# Patient Record
Sex: MALE | Race: WHITE | Employment: OTHER | URBAN - METROPOLITAN AREA
[De-identification: names, ages, dates, MRNs, and addresses within clinical notes are randomized per-mention and may not be internally consistent; named-entity substitution may affect disease eponyms.]

---

## 2018-03-27 ENCOUNTER — TELEPHONE (OUTPATIENT)
Dept: SURGERY | Age: 58
End: 2018-03-27

## 2020-08-05 ENCOUNTER — APPOINTMENT (OUTPATIENT)
Dept: CT IMAGING | Age: 60
End: 2020-08-05
Payer: MEDICARE

## 2020-08-05 ENCOUNTER — APPOINTMENT (OUTPATIENT)
Dept: NUCLEAR MEDICINE | Age: 60
End: 2020-08-05
Attending: FAMILY MEDICINE
Payer: MEDICARE

## 2020-08-05 ENCOUNTER — APPOINTMENT (OUTPATIENT)
Dept: NON INVASIVE DIAGNOSTICS | Age: 60
End: 2020-08-05
Attending: FAMILY MEDICINE
Payer: MEDICARE

## 2020-08-05 ENCOUNTER — HOSPITAL ENCOUNTER (EMERGENCY)
Age: 60
Discharge: ANOTHER HOSPITAL | End: 2020-08-05
Attending: EMERGENCY MEDICINE
Payer: MEDICARE

## 2020-08-05 ENCOUNTER — HOSPITAL ENCOUNTER (OUTPATIENT)
Age: 60
Setting detail: OBSERVATION
Discharge: HOME | End: 2020-08-05
Attending: FAMILY MEDICINE | Admitting: FAMILY MEDICINE
Payer: MEDICARE

## 2020-08-05 VITALS
DIASTOLIC BLOOD PRESSURE: 76 MMHG | RESPIRATION RATE: 16 BRPM | SYSTOLIC BLOOD PRESSURE: 115 MMHG | BODY MASS INDEX: 22.46 KG/M2 | HEART RATE: 64 BPM | WEIGHT: 175 LBS | TEMPERATURE: 97.8 F | OXYGEN SATURATION: 97 % | HEIGHT: 74 IN

## 2020-08-05 VITALS
TEMPERATURE: 97.9 F | BODY MASS INDEX: 24.79 KG/M2 | HEART RATE: 60 BPM | SYSTOLIC BLOOD PRESSURE: 135 MMHG | HEIGHT: 72 IN | OXYGEN SATURATION: 97 % | RESPIRATION RATE: 18 BRPM | DIASTOLIC BLOOD PRESSURE: 88 MMHG | WEIGHT: 183 LBS

## 2020-08-05 PROBLEM — R07.9 CHEST PAIN: Status: ACTIVE | Noted: 2020-08-05

## 2020-08-05 PROBLEM — R10.13 EPIGASTRIC PAIN: Status: ACTIVE | Noted: 2020-08-05

## 2020-08-05 PROBLEM — R07.9 CHEST PAIN: Status: RESOLVED | Noted: 2020-08-05 | Resolved: 2020-08-05

## 2020-08-05 PROBLEM — R79.89 ELEVATED SERUM CREATININE: Status: ACTIVE | Noted: 2020-08-05

## 2020-08-05 LAB
ALBUMIN SERPL-MCNC: 4.5 G/DL (ref 3.5–5.2)
ALP BLD-CCNC: 67 U/L (ref 40–129)
ALT SERPL-CCNC: 9 U/L (ref 0–40)
ANION GAP SERPL CALCULATED.3IONS-SCNC: 14 MMOL/L (ref 7–16)
APTT: 30.3 SEC (ref 24.5–35.1)
AST SERPL-CCNC: 17 U/L (ref 0–39)
BILIRUB SERPL-MCNC: 0.7 MG/DL (ref 0–1.2)
BUN BLDV-MCNC: 18 MG/DL (ref 8–23)
CALCIUM SERPL-MCNC: 9.6 MG/DL (ref 8.6–10.2)
CHLORIDE BLD-SCNC: 101 MMOL/L (ref 98–107)
CHOLESTEROL, TOTAL: 128 MG/DL (ref 0–199)
CO2: 23 MMOL/L (ref 22–29)
CREAT SERPL-MCNC: 1.4 MG/DL (ref 0.7–1.2)
EKG ATRIAL RATE: 58 BPM
EKG P AXIS: 42 DEGREES
EKG P-R INTERVAL: 140 MS
EKG Q-T INTERVAL: 456 MS
EKG QRS DURATION: 102 MS
EKG QTC CALCULATION (BAZETT): 447 MS
EKG R AXIS: 17 DEGREES
EKG T AXIS: 52 DEGREES
EKG VENTRICULAR RATE: 58 BPM
GFR AFRICAN AMERICAN: >60
GFR NON-AFRICAN AMERICAN: 51 ML/MIN/1.73
GLUCOSE BLD-MCNC: 149 MG/DL (ref 74–99)
HCT VFR BLD CALC: 45.5 % (ref 37–54)
HDLC SERPL-MCNC: 48 MG/DL
HEMOGLOBIN: 16.3 G/DL (ref 12.5–16.5)
INR BLD: 1.1
LDL CHOLESTEROL CALCULATED: 64 MG/DL (ref 0–99)
LIPASE: 44 U/L (ref 13–60)
LV EF: 59 %
LVEF MODALITY: NORMAL
MCH RBC QN AUTO: 31.8 PG (ref 26–35)
MCHC RBC AUTO-ENTMCNC: 35.8 % (ref 32–34.5)
MCV RBC AUTO: 88.9 FL (ref 80–99.9)
PDW BLD-RTO: 12.2 FL (ref 11.5–15)
PLATELET # BLD: 237 E9/L (ref 130–450)
PMV BLD AUTO: 12 FL (ref 7–12)
POTASSIUM SERPL-SCNC: 3.5 MMOL/L (ref 3.5–5)
PRO-BNP: 90 PG/ML (ref 0–125)
PROTHROMBIN TIME: 12.4 SEC (ref 9.3–12.4)
RBC # BLD: 5.12 E12/L (ref 3.8–5.8)
SODIUM BLD-SCNC: 138 MMOL/L (ref 132–146)
TOTAL PROTEIN: 7.5 G/DL (ref 6.4–8.3)
TRIGL SERPL-MCNC: 80 MG/DL (ref 0–149)
TROPONIN: <0.01 NG/ML (ref 0–0.03)
VLDLC SERPL CALC-MCNC: 16 MG/DL
WBC # BLD: 14 E9/L (ref 4.5–11.5)

## 2020-08-05 PROCEDURE — 85730 THROMBOPLASTIN TIME PARTIAL: CPT

## 2020-08-05 PROCEDURE — 71275 CT ANGIOGRAPHY CHEST: CPT

## 2020-08-05 PROCEDURE — 78452 HT MUSCLE IMAGE SPECT MULT: CPT

## 2020-08-05 PROCEDURE — 85610 PROTHROMBIN TIME: CPT

## 2020-08-05 PROCEDURE — 85027 COMPLETE CBC AUTOMATED: CPT

## 2020-08-05 PROCEDURE — 84484 ASSAY OF TROPONIN QUANT: CPT

## 2020-08-05 PROCEDURE — G0378 HOSPITAL OBSERVATION PER HR: HCPCS

## 2020-08-05 PROCEDURE — 2580000003 HC RX 258: Performed by: EMERGENCY MEDICINE

## 2020-08-05 PROCEDURE — 2500000003 HC RX 250 WO HCPCS: Performed by: EMERGENCY MEDICINE

## 2020-08-05 PROCEDURE — 6360000002 HC RX W HCPCS: Performed by: FAMILY MEDICINE

## 2020-08-05 PROCEDURE — 3430000000 HC RX DIAGNOSTIC RADIOPHARMACEUTICAL: Performed by: RADIOLOGY

## 2020-08-05 PROCEDURE — A9500 TC99M SESTAMIBI: HCPCS | Performed by: RADIOLOGY

## 2020-08-05 PROCEDURE — G0379 DIRECT REFER HOSPITAL OBSERV: HCPCS

## 2020-08-05 PROCEDURE — 80061 LIPID PANEL: CPT

## 2020-08-05 PROCEDURE — 93017 CV STRESS TEST TRACING ONLY: CPT

## 2020-08-05 PROCEDURE — 96372 THER/PROPH/DIAG INJ SC/IM: CPT

## 2020-08-05 PROCEDURE — 36415 COLL VENOUS BLD VENIPUNCTURE: CPT

## 2020-08-05 PROCEDURE — 99285 EMERGENCY DEPT VISIT HI MDM: CPT

## 2020-08-05 PROCEDURE — 6370000000 HC RX 637 (ALT 250 FOR IP): Performed by: EMERGENCY MEDICINE

## 2020-08-05 PROCEDURE — 6360000002 HC RX W HCPCS: Performed by: EMERGENCY MEDICINE

## 2020-08-05 PROCEDURE — 93018 CV STRESS TEST I&R ONLY: CPT | Performed by: INTERNAL MEDICINE

## 2020-08-05 PROCEDURE — 93016 CV STRESS TEST SUPVJ ONLY: CPT | Performed by: INTERNAL MEDICINE

## 2020-08-05 PROCEDURE — 6360000004 HC RX CONTRAST MEDICATION: Performed by: RADIOLOGY

## 2020-08-05 PROCEDURE — 99219 PR INITIAL OBSERVATION CARE/DAY 50 MINUTES: CPT | Performed by: FAMILY MEDICINE

## 2020-08-05 PROCEDURE — 80053 COMPREHEN METABOLIC PANEL: CPT

## 2020-08-05 PROCEDURE — 6370000000 HC RX 637 (ALT 250 FOR IP): Performed by: FAMILY MEDICINE

## 2020-08-05 PROCEDURE — 83880 ASSAY OF NATRIURETIC PEPTIDE: CPT

## 2020-08-05 PROCEDURE — 83690 ASSAY OF LIPASE: CPT

## 2020-08-05 PROCEDURE — 96374 THER/PROPH/DIAG INJ IV PUSH: CPT

## 2020-08-05 PROCEDURE — 99284 EMERGENCY DEPT VISIT MOD MDM: CPT

## 2020-08-05 PROCEDURE — 2580000003 HC RX 258: Performed by: RADIOLOGY

## 2020-08-05 PROCEDURE — 2580000003 HC RX 258: Performed by: FAMILY MEDICINE

## 2020-08-05 PROCEDURE — 96375 TX/PRO/DX INJ NEW DRUG ADDON: CPT

## 2020-08-05 PROCEDURE — 93005 ELECTROCARDIOGRAM TRACING: CPT | Performed by: EMERGENCY MEDICINE

## 2020-08-05 RX ORDER — SODIUM CHLORIDE 0.9 % (FLUSH) 0.9 %
10 SYRINGE (ML) INJECTION ONCE
Status: COMPLETED | OUTPATIENT
Start: 2020-08-05 | End: 2020-08-05

## 2020-08-05 RX ORDER — SODIUM CHLORIDE 9 MG/ML
INJECTION, SOLUTION INTRAVENOUS CONTINUOUS
Status: DISCONTINUED | OUTPATIENT
Start: 2020-08-05 | End: 2020-08-05 | Stop reason: HOSPADM

## 2020-08-05 RX ORDER — ASPIRIN 81 MG/1
81 TABLET, CHEWABLE ORAL DAILY
Qty: 30 TABLET | Refills: 3 | Status: SHIPPED | OUTPATIENT
Start: 2020-08-06 | End: 2021-09-07

## 2020-08-05 RX ORDER — ACETAMINOPHEN 650 MG/1
650 SUPPOSITORY RECTAL EVERY 6 HOURS PRN
Status: DISCONTINUED | OUTPATIENT
Start: 2020-08-05 | End: 2020-08-05 | Stop reason: HOSPADM

## 2020-08-05 RX ORDER — FENTANYL CITRATE 50 UG/ML
50 INJECTION, SOLUTION INTRAMUSCULAR; INTRAVENOUS ONCE
Status: COMPLETED | OUTPATIENT
Start: 2020-08-05 | End: 2020-08-05

## 2020-08-05 RX ORDER — NITROGLYCERIN 0.4 MG/1
0.4 TABLET SUBLINGUAL EVERY 5 MIN PRN
Status: DISCONTINUED | OUTPATIENT
Start: 2020-08-05 | End: 2020-08-05 | Stop reason: HOSPADM

## 2020-08-05 RX ORDER — 0.9 % SODIUM CHLORIDE 0.9 %
1000 INTRAVENOUS SOLUTION INTRAVENOUS ONCE
Status: COMPLETED | OUTPATIENT
Start: 2020-08-05 | End: 2020-08-05

## 2020-08-05 RX ORDER — ATORVASTATIN CALCIUM 40 MG/1
40 TABLET, FILM COATED ORAL NIGHTLY
Status: DISCONTINUED | OUTPATIENT
Start: 2020-08-05 | End: 2020-08-05 | Stop reason: HOSPADM

## 2020-08-05 RX ORDER — SODIUM CHLORIDE 0.9 % (FLUSH) 0.9 %
10 SYRINGE (ML) INJECTION PRN
Status: DISCONTINUED | OUTPATIENT
Start: 2020-08-05 | End: 2020-08-05 | Stop reason: HOSPADM

## 2020-08-05 RX ORDER — ONDANSETRON 2 MG/ML
4 INJECTION INTRAMUSCULAR; INTRAVENOUS EVERY 6 HOURS PRN
Status: DISCONTINUED | OUTPATIENT
Start: 2020-08-05 | End: 2020-08-05 | Stop reason: HOSPADM

## 2020-08-05 RX ORDER — ASPIRIN 81 MG/1
324 TABLET, CHEWABLE ORAL ONCE
Status: COMPLETED | OUTPATIENT
Start: 2020-08-05 | End: 2020-08-05

## 2020-08-05 RX ORDER — ONDANSETRON 2 MG/ML
4 INJECTION INTRAMUSCULAR; INTRAVENOUS ONCE
Status: COMPLETED | OUTPATIENT
Start: 2020-08-05 | End: 2020-08-05

## 2020-08-05 RX ORDER — SODIUM CHLORIDE 0.9 % (FLUSH) 0.9 %
10 SYRINGE (ML) INJECTION EVERY 12 HOURS SCHEDULED
Status: DISCONTINUED | OUTPATIENT
Start: 2020-08-05 | End: 2020-08-05 | Stop reason: HOSPADM

## 2020-08-05 RX ORDER — KETOROLAC TROMETHAMINE 30 MG/ML
15 INJECTION, SOLUTION INTRAMUSCULAR; INTRAVENOUS ONCE
Status: COMPLETED | OUTPATIENT
Start: 2020-08-05 | End: 2020-08-05

## 2020-08-05 RX ORDER — ATORVASTATIN CALCIUM 40 MG/1
40 TABLET, FILM COATED ORAL NIGHTLY
Qty: 30 TABLET | Refills: 3 | Status: SHIPPED | OUTPATIENT
Start: 2020-08-05 | End: 2020-08-07

## 2020-08-05 RX ORDER — POLYETHYLENE GLYCOL 3350 17 G/17G
17 POWDER, FOR SOLUTION ORAL DAILY PRN
Status: DISCONTINUED | OUTPATIENT
Start: 2020-08-05 | End: 2020-08-05 | Stop reason: HOSPADM

## 2020-08-05 RX ORDER — ASPIRIN 81 MG/1
81 TABLET, CHEWABLE ORAL DAILY
Status: DISCONTINUED | OUTPATIENT
Start: 2020-08-06 | End: 2020-08-05 | Stop reason: HOSPADM

## 2020-08-05 RX ORDER — PANTOPRAZOLE SODIUM 40 MG/1
40 TABLET, DELAYED RELEASE ORAL
Status: DISCONTINUED | OUTPATIENT
Start: 2020-08-05 | End: 2020-08-05 | Stop reason: HOSPADM

## 2020-08-05 RX ORDER — PROMETHAZINE HYDROCHLORIDE 25 MG/1
12.5 TABLET ORAL EVERY 6 HOURS PRN
Status: DISCONTINUED | OUTPATIENT
Start: 2020-08-05 | End: 2020-08-05 | Stop reason: HOSPADM

## 2020-08-05 RX ORDER — ACETAMINOPHEN 325 MG/1
650 TABLET ORAL EVERY 6 HOURS PRN
Status: DISCONTINUED | OUTPATIENT
Start: 2020-08-05 | End: 2020-08-05 | Stop reason: HOSPADM

## 2020-08-05 RX ADMIN — SODIUM CHLORIDE 1000 ML: 9 INJECTION, SOLUTION INTRAVENOUS at 01:31

## 2020-08-05 RX ADMIN — ONDANSETRON 4 MG: 2 INJECTION INTRAMUSCULAR; INTRAVENOUS at 00:42

## 2020-08-05 RX ADMIN — SODIUM CHLORIDE, PRESERVATIVE FREE 10 ML: 5 INJECTION INTRAVENOUS at 08:17

## 2020-08-05 RX ADMIN — Medication 10 ML: at 01:44

## 2020-08-05 RX ADMIN — LIDOCAINE HYDROCHLORIDE: 20 SOLUTION ORAL; TOPICAL at 01:29

## 2020-08-05 RX ADMIN — IOPAMIDOL 90 ML: 755 INJECTION, SOLUTION INTRAVENOUS at 01:44

## 2020-08-05 RX ADMIN — Medication 10 MILLICURIE: at 11:43

## 2020-08-05 RX ADMIN — ENOXAPARIN SODIUM 40 MG: 40 INJECTION SUBCUTANEOUS at 08:17

## 2020-08-05 RX ADMIN — NITROGLYCERIN 0.4 MG: 0.4 TABLET, ORALLY DISINTEGRATING SUBLINGUAL at 00:48

## 2020-08-05 RX ADMIN — SODIUM CHLORIDE: 9 INJECTION, SOLUTION INTRAVENOUS at 08:17

## 2020-08-05 RX ADMIN — NITROGLYCERIN 0.4 MG: 0.4 TABLET, ORALLY DISINTEGRATING SUBLINGUAL at 00:43

## 2020-08-05 RX ADMIN — ASPIRIN 324 MG: 81 TABLET, CHEWABLE ORAL at 00:40

## 2020-08-05 RX ADMIN — KETOROLAC TROMETHAMINE 15 MG: 30 INJECTION, SOLUTION INTRAMUSCULAR at 01:56

## 2020-08-05 RX ADMIN — FAMOTIDINE 20 MG: 10 INJECTION INTRAVENOUS at 00:43

## 2020-08-05 RX ADMIN — Medication 30 MILLICURIE: at 11:43

## 2020-08-05 RX ADMIN — PANTOPRAZOLE SODIUM 40 MG: 40 TABLET, DELAYED RELEASE ORAL at 08:17

## 2020-08-05 RX ADMIN — FENTANYL CITRATE 50 MCG: 50 INJECTION INTRAMUSCULAR; INTRAVENOUS at 00:54

## 2020-08-05 ASSESSMENT — PAIN DESCRIPTION - ORIENTATION: ORIENTATION: MID

## 2020-08-05 ASSESSMENT — ENCOUNTER SYMPTOMS
TROUBLE SWALLOWING: 0
VOMITING: 0
NAUSEA: 1
COUGH: 0
DIARRHEA: 0
SHORTNESS OF BREATH: 0
CONSTIPATION: 0
ABDOMINAL PAIN: 1
BACK PAIN: 1
PHOTOPHOBIA: 0

## 2020-08-05 ASSESSMENT — PAIN SCALES - GENERAL
PAINLEVEL_OUTOF10: 10
PAINLEVEL_OUTOF10: 10
PAINLEVEL_OUTOF10: 0
PAINLEVEL_OUTOF10: 10

## 2020-08-05 ASSESSMENT — PAIN DESCRIPTION - ONSET
ONSET: ON-GOING
ONSET: ON-GOING

## 2020-08-05 ASSESSMENT — PAIN DESCRIPTION - PROGRESSION
CLINICAL_PROGRESSION: NOT CHANGED
CLINICAL_PROGRESSION: GRADUALLY IMPROVING

## 2020-08-05 ASSESSMENT — PAIN - FUNCTIONAL ASSESSMENT
PAIN_FUNCTIONAL_ASSESSMENT: PREVENTS OR INTERFERES SOME ACTIVE ACTIVITIES AND ADLS
PAIN_FUNCTIONAL_ASSESSMENT: PREVENTS OR INTERFERES SOME ACTIVE ACTIVITIES AND ADLS

## 2020-08-05 ASSESSMENT — PAIN DESCRIPTION - DESCRIPTORS
DESCRIPTORS: DISCOMFORT
DESCRIPTORS: CONSTANT;HEAVINESS

## 2020-08-05 ASSESSMENT — PAIN DESCRIPTION - FREQUENCY: FREQUENCY: CONTINUOUS

## 2020-08-05 ASSESSMENT — PAIN DESCRIPTION - LOCATION
LOCATION: CHEST
LOCATION: CHEST

## 2020-08-05 ASSESSMENT — PAIN DESCRIPTION - PAIN TYPE
TYPE: ACUTE PAIN
TYPE: ACUTE PAIN

## 2020-08-05 NOTE — PROGRESS NOTES
Patient seen and examined at bedside. Currently having no chest pain. Resting comfortably. Denies palpitations, abdominal pain, N/V, and shortness of breath. Physical examination showed heart RRR; lungs CTAB and without wheezing, rhonci, or rales; abdomen soft with epigastric/xyphoid tenderness to palpation; no LE edema. No changes to original plan. Await troponin #2 and #3. Await stress test results.      Electronically signed by Ryanne Ahn DO on 8/5/2020 at 7:39 AM

## 2020-08-05 NOTE — H&P
[place] [id number]  Resident History and Physical      CHIEF COMPLAINT: Chest pain and Epigastric pain     History of Present Illness:   Ryanne Ponce  is a 63 y.o. male patient of Beth Guidry MD  with a pertinent PMHx of tobacco abuse who presented to the [facility] per direct admit with chief complaint of chest and epigastric pain for the past couple hours. Patient reports that he was at home when he started to experience chest/epigastric pain a few hours prior to arrival in the ED. The pain was more severe than with previous episodes and prompted his decision to seek care. It was associated with mild diaphoresis and nausea. He is unable to describe the quality of the pain. It is located below his sternum and in epigastric region with some pain in his upper arms at times. The pain usually lasts several hours. He has noticed some association with certain foods and avoids \"red\" foods as he can. He also has had increased pain after playing with his 4 grandchildren for a weekend in the past. He has not tried alleviating his pain with OTC medications. He has tried changing his sleep position and standing under a warm shower which has helped in the past. OTC tums and heart burn pills have helped as well. He has had similar episodes occurring intermittently over the past several months. Cardiac history  . [facility] reports smoking a 1/2 pack a day for the past 30 years. There is no stress test on file. In the [facility] ED, pt was initially given mitroglycerin,  mg, ondansetron 4 mg IV, Famotidine 20 mg IV, fentanyl 50 mcg IV. He was later given a GI cocktail and ketorolac 15 mg. Pt reports that his pain did not improve at first but did somewhat later on. His EKG was unchanged from previous. CTA chest did not reveal evidence of PE. Heart score was determined to be 4 points. Labs were remarkable for an elevated creatinine 1.4, troponin negative, WBC 14.  The pt's BP was initially elevated but has since normalized. Pt was directly admitted from the ED due to acute chest pain. ROS:   Review of Systems   Constitutional: Positive for diaphoresis. Negative for chills and fever. HENT: Negative for congestion and trouble swallowing. Eyes: Negative for photophobia and visual disturbance. Respiratory: Negative for cough and shortness of breath. Cardiovascular: Positive for chest pain. Negative for leg swelling. Gastrointestinal: Positive for abdominal pain and nausea. Negative for constipation, diarrhea and vomiting. Genitourinary: Negative for dysuria and hematuria. Musculoskeletal: Positive for back pain. Skin: Negative for rash and wound. Neurological: Negative for light-headedness and headaches. Psychiatric/Behavioral: Negative for confusion. The patient is not nervous/anxious.           Past Medical History:   Diagnosis Date    Acute prostatitis 4/3/2015    4/2015    Chronic back pain     GERD (gastroesophageal reflux disease) 10/10/2014    Herniated nucleus pulposus, L2-3 left     Herniated nucleus pulposus, L4-5     Inguinal hernia 8-14-15    Right, for repair/revision    Left shoulder pain 10/10/2014    Since 9/2014    Right inguinal hernia 10/10/2014    Sprain and strain of lumbosacral joint/ligament          Past Surgical History:   Procedure Laterality Date    BACK SURGERY  7/28/03    LLAM L3-4 left by Dr. Chaz Lew COLONOSCOPY  2014     normal    FOOT SURGERY Left 1980's    to remove pinched nerve    HERNIA REPAIR Right 11/18/2014    inguinal- laproscopic    INGUINAL HERNIA REPAIR  8/`14/15    right     MOUTH SURGERY      multiple surgeries    OTHER SURGICAL HISTORY Left 1990's    thumb to repair traumatic injury    OTHER SURGICAL HISTORY Right 11/185/2014    laparoscopic right inguinal hernia repair    TONSILLECTOMY      UPPER GASTROINTESTINAL ENDOSCOPY  2014    normal       Medications Prior to Admission:    Prior to Admission medications    Medication Sig Start Date End Date Taking? Authorizing Provider   omeprazole (PRILOSEC) 20 MG capsule TAKE 1 CAPSULE BY MOUTH ONCE DAILY 9/3/15  Yes Barron Pina MD        Allergies:   Patient has no known allergies. Social History:    reports that he has been smoking. He has a 15.00 pack-year smoking history. He has never used smokeless tobacco. He reports that he does not drink alcohol or use drugs. Family History:   family history includes Blindness in his son; Cancer in his brother and father; High Blood Pressure in his mother; Other (age of onset: 10) in his son. PHYSICAL EXAM:    Vitals:  /78   Pulse 68   Temp 97.7 °F (36.5 °C) (Oral)   Resp 16   Ht 6' (1.829 m)   Wt 183 lb (83 kg)   SpO2 98%   BMI 24.82 kg/m²     Physical Exam  Nursing note reviewed. Constitutional:       General: He is not in acute distress. Appearance: He is normal weight. He is not ill-appearing, toxic-appearing or diaphoretic. HENT:      Head: Normocephalic and atraumatic. Mouth/Throat:      Mouth: Mucous membranes are moist.      Pharynx: Oropharynx is clear. No oropharyngeal exudate or posterior oropharyngeal erythema. Eyes:      Conjunctiva/sclera: Conjunctivae normal.      Pupils: Pupils are equal, round, and reactive to light. Cardiovascular:      Rate and Rhythm: Normal rate and regular rhythm. Heart sounds: Normal heart sounds. No murmur. No gallop. Pulmonary:      Effort: Pulmonary effort is normal.      Breath sounds: Normal breath sounds. No wheezing, rhonchi or rales. Abdominal:      General: Bowel sounds are normal. There is no distension. Palpations: Abdomen is soft. Tenderness: There is abdominal tenderness in the epigastric area. There is no guarding. Comments: Pt very tender on palpation of epigastric region   Musculoskeletal:      Right lower leg: No edema. Left lower leg: No edema.       Comments: No reproducible chest wall tenderness Skin:     General: Skin is warm. Findings: No rash. Neurological:      General: No focal deficit present. Mental Status: He is alert and oriented to person, place, and time.    Psychiatric:         Mood and Affect: Mood normal.         Behavior: Behavior normal.         LABS:  Recent Results (from the past 24 hour(s))   CBC    Collection Time: 08/05/20  1:03 AM   Result Value Ref Range    WBC 14.0 (H) 4.5 - 11.5 E9/L    RBC 5.12 3.80 - 5.80 E12/L    Hemoglobin 16.3 12.5 - 16.5 g/dL    Hematocrit 45.5 37.0 - 54.0 %    MCV 88.9 80.0 - 99.9 fL    MCH 31.8 26.0 - 35.0 pg    MCHC 35.8 (H) 32.0 - 34.5 %    RDW 12.2 11.5 - 15.0 fL    Platelets 863 625 - 714 E9/L    MPV 12.0 7.0 - 12.0 fL   Protime-INR    Collection Time: 08/05/20  1:03 AM   Result Value Ref Range    Protime 12.4 9.3 - 12.4 sec    INR 1.1    APTT    Collection Time: 08/05/20  1:03 AM   Result Value Ref Range    aPTT 30.3 24.5 - 35.1 sec   Troponin    Collection Time: 08/05/20  1:03 AM   Result Value Ref Range    Troponin <0.01 0.00 - 0.03 ng/mL   Brain Natriuretic Peptide    Collection Time: 08/05/20  1:03 AM   Result Value Ref Range    Pro-BNP 90 0 - 125 pg/mL   Comprehensive metabolic panel    Collection Time: 08/05/20  1:03 AM   Result Value Ref Range    Sodium 138 132 - 146 mmol/L    Potassium 3.5 3.5 - 5.0 mmol/L    Chloride 101 98 - 107 mmol/L    CO2 23 22 - 29 mmol/L    Anion Gap 14 7 - 16 mmol/L    Glucose 149 (H) 74 - 99 mg/dL    BUN 18 8 - 23 mg/dL    CREATININE 1.4 (H) 0.7 - 1.2 mg/dL    GFR Non-African American 51 >=60 mL/min/1.73    GFR African American >60     Calcium 9.6 8.6 - 10.2 mg/dL    Total Protein 7.5 6.4 - 8.3 g/dL    Alb 4.5 3.5 - 5.2 g/dL    Total Bilirubin 0.7 0.0 - 1.2 mg/dL    Alkaline Phosphatase 67 40 - 129 U/L    ALT 9 0 - 40 U/L    AST 17 0 - 39 U/L   Lipase    Collection Time: 08/05/20  1:03 AM   Result Value Ref Range    Lipase 44 13 - 60 U/L       No orders to display       ASSESSMENT/PLAN:      Active Hospital Problems    Diagnosis Date Noted    Chest pain [R07.9] 08/05/2020    Elevated serum creatinine [R79.89] 08/05/2020    Epigastric pain [R10.13] 08/05/2020     Chest pain  Risk factors: tobacco use  Cardiac pain less likely than GI etiology. Pain did not improve significantly with nitro. No stress test on file. - Exercise stress test with nuclear imagining  - start atorvastatin 40 mg and aspirin 81 mg daily - evaluate need for continuation after further work up  - lipid panel, hemoglobin A1c for risk stratification  - Nitro PRN  - telemetry   - repeat EKG tomorrow  - cycle troponin     Epigastric pain  Likely main contributor to pain. Tenderness to epigastric region. Risk factors for gastric tumor include his age and smoking. Likely component of gastritis due to frequent episodes of epigastric pain, unhealthy diet and location of pain. - start daily pantoprazole 40 mg daily   - would recommend outpatient EGD  - POCT hemoccult     Elevated creatinine  Historically normal creatinine. No recent value to determine if acute or chronic. Poor outpatient follow up with PCP. Did receive contrast for CTA which will likely worsen kidney function. - MIVF NS @ 100 ml/hr  - urine electrolytes, urine creatinine to calculate FeNa   - urinalysis   - daily CMP    Mildly elevated white blood cell count  No indication of infection. Monitor with daily CBC    DVT ppx: enoxaparin 40 mg   GI ppx: pantoprazole 40 mg daily   Code Status: full code     Case discussed with attending on call Dr. Joey Cabot.     Pascale Al MD  Family Medicine Resident Physician   8/5/2020

## 2020-08-05 NOTE — ED PROVIDER NOTES
HPI:  8/5/20,   Time: 12:23 AM EDT      Dasha Boateng is a 63 y.o. male presenting to the ED for midsternal chest pain, beginning 2 hours ago. The complaint has been persistent, moderate in severity, and worsened by nothing. Patient denies any shortness of breath but did have some mild diaphoresis associated. He did have nausea upon arriving here with no vomiting. No radiation of chest discomfort to the neck/jaw nor to the left upper extremity. No sharp stabbing character pain and no radiation of pain to the back. Review of Systems:   Pertinent positives and negatives are stated within HPI, all other systems reviewed and are negative.      --------------------------------------------- PAST HISTORY ---------------------------------------------  Past Medical History:  has a past medical history of Acute prostatitis, Chronic back pain, GERD (gastroesophageal reflux disease), Herniated nucleus pulposus, L2-3 left, Herniated nucleus pulposus, L4-5, Inguinal hernia, Left shoulder pain, Right inguinal hernia, and Sprain and strain of lumbosacral joint/ligament. Past Surgical History:  has a past surgical history that includes back surgery (7/28/03); Foot surgery (Left, 1980's); Mouth surgery; Tonsillectomy; Upper gastrointestinal endoscopy (2014); other surgical history (Left, 1990's); other surgical history (Right, 11/185/2014); hernia repair (Right, 11/18/2014); Colonoscopy (2014 ); and Inguinal hernia repair (8/`14/15). Social History:  reports that he has been smoking. He has a 15.00 pack-year smoking history. He has never used smokeless tobacco. He reports that he does not drink alcohol or use drugs. Family History: family history includes Blindness in his son; Cancer in his brother and father; High Blood Pressure in his mother; Other (age of onset: 10) in his son. The patients home medications have been reviewed.     Allergies: Patient has no known allergies. ---------------------------------------------------PHYSICAL EXAM--------------------------------------    Constitutional/General: Alert and oriented x3, well appearing, non toxic in moderate distress. Head: Normocephalic and atraumatic  Eyes: PERRL, EOMI, conjunctive normal, sclera non icteric  Mouth: Oropharynx clear, handling secretions, no trismus, no asymmetry of the posterior oropharynx or uvular edema  Neck: Supple, full ROM, non tender to palpation in the midline, no stridor, no crepitus, no meningeal signs  Respiratory: Lungs clear to auscultation bilaterally, no wheezes, rales, or rhonchi. Not in respiratory distress  Cardiovascular:  Regular rate. Regular rhythm. No murmurs, gallops, or rubs. 2+ distal pulses  Chest: No chest wall tenderness  GI:  Abdomen Soft, Non tender, Non distended. +BS. No organomegaly, no palpable masses,  No rebound, guarding, or rigidity. Musculoskeletal: Moves all extremities x 4. Warm and well perfused, no clubbing, cyanosis, or edema. Capillary refill <3 seconds  Integument: skin warm and dry. No rashes. Lymphatic: no lymphadenopathy noted  Neurologic: GCS 15, no focal deficits, symmetric strength 5/5 in the upper and lower extremities bilaterally  Psychiatric: Normal Affect    -------------------------------------------------- RESULTS -------------------------------------------------  I have personally reviewed all laboratory and imaging results for this patient. Results are listed below.      LABS:  Results for orders placed or performed during the hospital encounter of 08/05/20   CBC   Result Value Ref Range    WBC 14.0 (H) 4.5 - 11.5 E9/L    RBC 5.12 3.80 - 5.80 E12/L    Hemoglobin 16.3 12.5 - 16.5 g/dL    Hematocrit 45.5 37.0 - 54.0 %    MCV 88.9 80.0 - 99.9 fL    MCH 31.8 26.0 - 35.0 pg    MCHC 35.8 (H) 32.0 - 34.5 %    RDW 12.2 11.5 - 15.0 fL    Platelets 203 545 - 492 E9/L    MPV 12.0 7.0 - 12.0 fL   Protime-INR   Result Value Ref Range    Protime 12.4 9.3 - 12.4 sec    INR 1.1    APTT   Result Value Ref Range    aPTT 30.3 24.5 - 35.1 sec   Troponin   Result Value Ref Range    Troponin <0.01 0.00 - 0.03 ng/mL   Brain Natriuretic Peptide   Result Value Ref Range    Pro-BNP 90 0 - 125 pg/mL   Comprehensive metabolic panel   Result Value Ref Range    Sodium 138 132 - 146 mmol/L    Potassium 3.5 3.5 - 5.0 mmol/L    Chloride 101 98 - 107 mmol/L    CO2 23 22 - 29 mmol/L    Anion Gap 14 7 - 16 mmol/L    Glucose 149 (H) 74 - 99 mg/dL    BUN 18 8 - 23 mg/dL    CREATININE 1.4 (H) 0.7 - 1.2 mg/dL    GFR Non-African American 51 >=60 mL/min/1.73    GFR African American >60     Calcium 9.6 8.6 - 10.2 mg/dL    Total Protein 7.5 6.4 - 8.3 g/dL    Alb 4.5 3.5 - 5.2 g/dL    Total Bilirubin 0.7 0.0 - 1.2 mg/dL    Alkaline Phosphatase 67 40 - 129 U/L    ALT 9 0 - 40 U/L    AST 17 0 - 39 U/L   Lipase   Result Value Ref Range    Lipase 44 13 - 60 U/L       RADIOLOGY:  Interpreted by Radiologist.  CTA PULMONARY W CONTRAST   Final Result      1. NORMAL CT OF THE THORAX WITH CTA OF THE PULMONARY ARTERIES WITH   CONTRAST ENHANCEMENT. 2. NO EVIDENCE OF PULMONARY EMBOLUS. ------------------------- NURSING NOTES AND VITALS REVIEWED ---------------------------    The nursing notes within the ED encounter and vital signs as below have been reviewed by myself. BP (!) 144/92   Pulse 62   Temp 97 °F (36.1 °C)   Ht 6' 2\" (1.88 m)   Wt 175 lb (79.4 kg)   SpO2 99%   BMI 22.47 kg/m² . Oxygen Saturation Interpretation: Normal  The patients available past medical records and past encounters were reviewed.       ------------------------------ ED COURSE/MEDICAL DECISION MAKING----------------------  Medications   nitroGLYCERIN (NITROSTAT) SL tablet 0.4 mg (0.4 mg Sublingual Given 8/5/20 0048)   aspirin chewable tablet 324 mg (324 mg Oral Given 8/5/20 0040)   ondansetron (ZOFRAN) injection 4 mg (4 mg Intravenous Given 8/5/20 0042)   famotidine (PEPCID) injection 20 mg (20 mg Intravenous Given 8/5/20 0043)   fentaNYL (SUBLIMAZE) injection 50 mcg (50 mcg Intravenous Given 8/5/20 0054)   0.9 % sodium chloride bolus (1,000 mLs Intravenous New Bag 8/5/20 0131)   aluminum & magnesium hydroxide-simethicone (MAALOX) 30 mL, lidocaine viscous hcl (XYLOCAINE) 5 mL (GI COCKTAIL) ( Oral Given 8/5/20 0129)   iopamidol (ISOVUE-370) 76 % injection 90 mL (90 mLs Intravenous Given 8/5/20 0144)   sodium chloride flush 0.9 % injection 10 mL (10 mLs Intravenous Given 8/5/20 0144)   ketorolac (TORADOL) injection 15 mg (15 mg Intravenous Given 8/5/20 0156)         ED COURSE:     Medical Decision Making:   Differential Diagnoses:  MI, PE, Chest Wall Strain, Pneumonia, GERD, Pneumothorax, Pericarditis, Aortic Dissection, COVID-19, to name a few. Pt's HEART Score = 4 points, Moderate Risk Score  Pt's Well's Score for PE = 0 points, and his CTA pulmonary study showed no evidence of focal infiltrates nor PE nor any mediastinal abnormalities to suggest aortic dissection    The HEART Risk Score for Acute Coronary Syndrome  Age <46 years, 55-63, 65+  ? 55-63   > 2 Risk Factors for CAD?*, 1-2, 0  2   History: slight, moderate, highly suspicious Moderate   EKG: Normal, nonspecific repolarization changes, ST depression  Nonspecific   Troponin; low, 1-3x normal  Limit, 3x+ Low   Total HEART Score:  4 points     *Risk factors as follows:                  - History/Family history of CAD    - Hypertension      - Hyperlipidemia     - Diabetes mellitus  - Current smoker  - Obesity    Predicts 6-week risk of major adverse cardiac event. Low Score (0-3 points), risk of MACE of 0.9-1.7%. Moderate Score (4-6 points), risk of MACE of 12-16.6%. High Score (7-10 points), risk of MACE of 50-65%. EKG: This EKG is signed and interpreted by the EP.     Time: 0:24  Rate: 58  Rhythm: Sinus Bradycardia  Interpretation: non-specific EKG  Comparison: stable as compared to patient's most recent EKG dated 8/14/20    This patient's ED course included: a personal history and physicial examination, re-evaluation prior to disposition, multiple bedside re-evaluations, IV medications, cardiac monitoring and continuous pulse oximetry  This patient has remained hemodynamically stable during their ED course. Re-Evaluations:        Re-evaluation. Patients symptoms are improving    Re-examination  8/5/20   12:23 AM EDT          Vital Signs:   Vitals:    08/05/20 0024 08/05/20 0030 08/05/20 0100 08/05/20 0130   BP:  (!) 190/110 (!) 140/87 (!) 144/92   Pulse:  66 59 62   Temp:  97 °F (36.1 °C)     SpO2:  99% 96% 99%   Weight: 175 lb (79.4 kg)      Height: 6' 2\" (1.88 m)        Card/Pulm:  Rhythm: normal rate. Heart Sounds: no murmurs, gallops, or rubs. clear to auscultation, no wheezes or rales and unlabored breathing. Capillary Refill: normal.  Radial Pulse:  present 2+. Skin:  Dry. Consultations:  Spoke w/ Dr. Deandra Winston @ 02:00 she agreed with to accept the patient to her service as an admission on behalf of Dr. Leldon Skiff. Pt. Will be admitted as OBS Telemetry ADM @ [facility]    Counseling: The emergency provider has spoken with the patient and discussed todays results, in addition to providing specific details for the plan of care and counseling regarding the diagnosis and prognosis. Questions are answered at this time and they are agreeable with the plan.       --------------------------------- IMPRESSION AND DISPOSITION ---------------------------------    IMPRESSION  1. Acute chest pain    2. Elevated blood pressure reading        DISPOSITION  Disposition: Admit to telemetry  Patient condition is stable    NOTE: This report was transcribed using voice recognition software.  Every effort was made to ensure accuracy; however, inadvertent computerized transcription errors may be present       Vy Ma MD  08/05/20 0692

## 2020-08-05 NOTE — PROCEDURES
Exercise Nuclear Stress Test:    Cardiologist: Dr. Loretta Abdullahi    Date: 8/5/2020    Indications for study: Chest pain    1. No chest pain  2. Exercise time: 8:31, MPHR: 94%  3. No new arrhythmias  4. No EKG changes suggestive of stress induced ischemia  5.  Nuclear images pending    Amber Castro MD  [facility]) Cardiology

## 2020-08-05 NOTE — CARE COORDINATION
Social work / Discharge Planning:      Social work attempted to see patient for initial assessment but he is out of the room for testing. Chart reviewed. Patient has health insurance and his PCP is Dr. Yulia Charles. Social work will follow and assist with any discharge needs identified.   Electronically signed by MERLENE Ortiz on 8/5/2020 at 12:50 PM

## 2020-08-05 NOTE — ED NOTES
Called [facility] and spoke with Karl Mckeon. ETA 30-45 minutes per [facility].  Awaiting arrival.      Ryan Melendez RN  08/05/20 [id number]

## 2020-08-05 NOTE — DISCHARGE SUMMARY
Physician Discharge Summary         Patient ID:  Renata Sumner  [id number]  89 y.o.  1960    Admit date: 8/5/2020    Discharge date and time: 08/05/2020, round 3pm    Admission Diagnoses:   Patient Active Problem List   Diagnosis    Sub. agg. of pre-existing disc herniation L3-4    Sub. agg. of pre-existing disc herniation L4-5    GERD (gastroesophageal reflux disease)    Left shoulder pain    Acute prostatitis    Elevated serum creatinine    Epigastric pain       Discharge Diagnoses:   Elevated creatinine  GERD  Chest pain- resolved     Consults: none    Procedures:  Cardiac Stress Test    Hospital Course: Renata Sumner is a 60y. o m pt of Dr. Belinda Atwood with PMHx of tobacco abuse who presented to ER for complaining of chest and epigastric pain. Pt started to have pain few hours prior to arrival , pain was associated with nausea. Pt mentions he has pain associated with certain foods in the past and OTC tums has helped him. In the ER pt was given Nitro,ASA, ondansetron, famotidine and fentanyl , but epigastric/chest pain had already resolved upon arrival.  Vitals were stable, Troponin negative and EKG without acute changes. During hospitalization pt did not had any chest pain and went for cardiac stress test which was negative for any perfusion defect and was with normal EF. Troponins were negative x3. Pt will be discharged in stable condition. Pt was started on Lipitor 40 mg due to high ASCVD risk 11.1% , and chest pain/epigastric is more likely to be GERD associated. Referral to General Surgery was made for EGD as an outpatient. Pt can continue PPI. Regarding elevated creatinine , pt will have another BMP tomorrow. Cr was likely elevated due to contrast from the CTA and dehydration. Fluids were given, expect improvement. Pending HbA1C.     Discharge Exam:  See progress note from today    Disposition: home    Patient Instructions:   Discharge Medication List as of 8/5/2020  2:41 PM      START taking these

## 2020-08-05 NOTE — ED NOTES
Report called to floor. Spoke with Go Pinto RN at [phone]. Patient departed ER.       Iman Díaz RN  08/05/20 9920

## 2020-08-06 ENCOUNTER — HOSPITAL ENCOUNTER (OUTPATIENT)
Age: 60
Discharge: HOME | End: 2020-08-06
Payer: MEDICARE

## 2020-08-06 LAB
ANION GAP SERPL CALCULATED.3IONS-SCNC: 10 MMOL/L (ref 7–16)
BUN BLDV-MCNC: 13 MG/DL (ref 8–23)
CALCIUM SERPL-MCNC: 9.2 MG/DL (ref 8.6–10.2)
CHLORIDE BLD-SCNC: 104 MMOL/L (ref 98–107)
CO2: 25 MMOL/L (ref 22–29)
CREAT SERPL-MCNC: 1.2 MG/DL (ref 0.7–1.2)
GFR AFRICAN AMERICAN: >60
GFR NON-AFRICAN AMERICAN: >60 ML/MIN/1.73
GLUCOSE BLD-MCNC: 96 MG/DL (ref 74–99)
POTASSIUM SERPL-SCNC: 4.3 MMOL/L (ref 3.5–5)
SODIUM BLD-SCNC: 139 MMOL/L (ref 132–146)

## 2020-08-06 PROCEDURE — 80048 BASIC METABOLIC PNL TOTAL CA: CPT

## 2020-08-06 PROCEDURE — 36415 COLL VENOUS BLD VENIPUNCTURE: CPT

## 2020-08-06 NOTE — PROGRESS NOTES
[facility]  [facility]  Family Medicine Residency Program      Patient:  Viridiana Cha 63 y.o. male     Date of Service: 8/7/20      Chief complaint:   Chief Complaint   Patient presents with   [id number] [employer] f/u   - GERD      History of Present Illness   The patient is a 63 y.o. male  presented to the clinic with complaints as above. GERD - Presented to the ER with chest and epigastric pain. He had it for a few hours with associated nausea. He states that when these severe symptoms come on it takes hours for them to resolve. He has had GERD symptoms for >20 years and it has gotten worse recently. The pain is associated with red meats, tomatoes, \"anything red\", water, and position changes can cause symptoms. He is currently taking famotidine 20mg 3-5 times per day. He had a scope greater than 5 years but does not know the findings. He is scheduled for an evaluation with Dr. Phylicia Alberto next Wednesday (8/12). He denies nausea, vomiting, or cough. Patient was started on Lipitor after his visit to the ER. His ASCVD Risk score was unable to be calculated. We discontinued his atorvastatin at this time. The ASCVD Risk score (Jamee Bassett., et al., 2013) failed to calculate for the following reasons:     The valid total cholesterol range is 130 to 320 mg/dL    Past Medical History:      Diagnosis Date    Acute prostatitis 4/3/2015    4/2015    Chronic back pain     GERD (gastroesophageal reflux disease) 10/10/2014    Herniated nucleus pulposus, L2-3 left     Herniated nucleus pulposus, L4-5     Inguinal hernia 8-14-15    Right, for repair/revision    Left shoulder pain 10/10/2014    Since 9/2014    Right inguinal hernia 10/10/2014    Sprain and strain of lumbosacral joint/ligament        Past Surgical History:        Procedure Laterality Date    BACK SURGERY  7/28/03    LLAM L3-4 left by Dr. Aldair Marcum COLONOSCOPY  2014     normal    FOOT SURGERY abused: Not on file     Physically abused: Not on file     Forced sexual activity: Not on file   Other Topics Concern    Not on file   Social History Narrative    Not on file        Family History:       Problem Relation Age of Onset    Cancer Father         unknown    Cancer Brother         bladder    Blindness Son         Left eye    Other Son 6        Turret's (movement)    High Blood Pressure Mother        Review of Systems:   Review of Systems   Constitutional: Positive for fatigue. Negative for chills and fever. HENT: Negative for rhinorrhea and sore throat. Respiratory: Negative for cough, chest tightness and shortness of breath. Cardiovascular: Negative for chest pain and palpitations. Gastrointestinal: Negative for abdominal pain, constipation, diarrhea and nausea. Genitourinary: Negative for difficulty urinating and dysuria. Physical Exam   Vitals: /84   Pulse 83   Temp 97 °F (36.1 °C) (Temporal)   Resp 16   Ht 6' (1.829 m)   Wt 177 lb (80.3 kg)   SpO2 98%   BMI 24.01 kg/m²   Physical Exam  Constitutional:       General: He is not in acute distress. Appearance: Normal appearance. HENT:      Head: Normocephalic and atraumatic. Eyes:      Extraocular Movements: Extraocular movements intact. Pupils: Pupils are equal, round, and reactive to light. Cardiovascular:      Rate and Rhythm: Normal rate and regular rhythm. Heart sounds: Normal heart sounds. No murmur. Pulmonary:      Effort: Pulmonary effort is normal.      Breath sounds: Normal breath sounds. Abdominal:      General: Bowel sounds are normal.      Palpations: Abdomen is soft. Tenderness: There is abdominal tenderness. Musculoskeletal:         General: No swelling. Neurological:      Mental Status: He is alert. Assessment and Plan     1.  Gastroesophageal reflux disease, esophagitis presence not specified  - Appointment with Dr. Malvin Aldana scheduled for next week  - Can take famotidine PRN with the omeprazole  - omeprazole (PRILOSEC) 40 MG delayed release capsule; Take 1 capsule by mouth every morning (before breakfast)  Dispense: 30 capsule; Refill: 0  - Can take Pepto OTC  - Counseled patient on trigger foods    2. Chest pain due to GERD  - Normal stress test with an EF of 59%  - Lipid panel normal, ASCVD can't be calculated due to low total cholesterol.  - Reviewed labs with patient   - Discontinued atorvastatin for now    Counseled regarding above diagnosis, including possible risks and complications,  especially if left uncontrolled. Counseled regarding the possible side effects, risks, benefits and alternatives to treatment;patient and/or guardian verbalizes understanding, agrees, feels comfortable with and wishes to proceed with above treatment plan. Call or go to ED immediately if symptoms worsen or persist. Advised patient to call with any new medication issues, and, as applicable, read all Rx info from pharmacy to assure aware of all possible risks and side effects of medication before taking. Patient and/or guardian given opportunity to ask questions/raise concerns. The patient verbalized comfort and understanding of instructions. I encourage further reading and education about your health conditions. Information on many health conditions is provided by the [employer] Academy [employer]: https://family doctor. org/  Please bring any questions to me at your next visit. Return to Office: Return in about 4 weeks (around 9/4/2020), or if symptoms worsen or fail to improve.     Medication List:    Current Outpatient Medications   Medication Sig Dispense Refill    famotidine (PEPCID) 20 MG tablet Take 20 mg by mouth 2 times daily as needed      omeprazole (PRILOSEC) 40 MG delayed release capsule Take 1 capsule by mouth every morning (before breakfast) 30 capsule 0    aspirin 81 MG chewable tablet Take 1 tablet by mouth daily 30 tablet 3     No current facility-administered medications for this visit. Ilia Szymanski,        This document may have been prepared at least partially through the use of voice recognition software. Although effort is taken to assure the accuracy of this document, it is possible that grammatical, syntax,  or spelling errors may occur.

## 2020-08-07 ENCOUNTER — OFFICE VISIT (OUTPATIENT)
Dept: FAMILY MEDICINE CLINIC | Age: 60
End: 2020-08-07
Payer: MEDICARE

## 2020-08-07 VITALS
HEART RATE: 83 BPM | TEMPERATURE: 97 F | BODY MASS INDEX: 23.98 KG/M2 | OXYGEN SATURATION: 98 % | HEIGHT: 72 IN | SYSTOLIC BLOOD PRESSURE: 139 MMHG | WEIGHT: 177 LBS | DIASTOLIC BLOOD PRESSURE: 84 MMHG | RESPIRATION RATE: 16 BRPM

## 2020-08-07 PROCEDURE — 99213 OFFICE O/P EST LOW 20 MIN: CPT | Performed by: FAMILY MEDICINE

## 2020-08-07 RX ORDER — FAMOTIDINE 20 MG/1
20 TABLET, FILM COATED ORAL 2 TIMES DAILY PRN
COMMUNITY
End: 2020-08-31

## 2020-08-07 RX ORDER — OMEPRAZOLE 40 MG/1
40 CAPSULE, DELAYED RELEASE ORAL
Qty: 30 CAPSULE | Refills: 0 | Status: ON HOLD
Start: 2020-08-07 | End: 2020-09-03 | Stop reason: HOSPADM

## 2020-08-07 ASSESSMENT — PATIENT HEALTH QUESTIONNAIRE - PHQ9
SUM OF ALL RESPONSES TO PHQ QUESTIONS 1-9: 0
SUM OF ALL RESPONSES TO PHQ9 QUESTIONS 1 & 2: 0
SUM OF ALL RESPONSES TO PHQ QUESTIONS 1-9: 0
2. FEELING DOWN, DEPRESSED OR HOPELESS: 0
1. LITTLE INTEREST OR PLEASURE IN DOING THINGS: 0

## 2020-08-07 ASSESSMENT — ENCOUNTER SYMPTOMS
COUGH: 0
CHEST TIGHTNESS: 0
RHINORRHEA: 0
NAUSEA: 0
SORE THROAT: 0
CONSTIPATION: 0
SHORTNESS OF BREATH: 0
DIARRHEA: 0
ABDOMINAL PAIN: 0

## 2020-08-07 NOTE — PROGRESS NOTES
S: 63 y.o. male with   Chief Complaint   Patient presents with   [id number] [facility] f/u       ED 2 days ago, +CP, thought to be GERD, started on PPI and Atorvastatin  Didn't start PPI, taking Pepcid instead (takes 3-5 times/day depending on food), symptoms worse with different foods and position, sometimes reflux with water, appt with gen surg next week, possible EGD previously, when gets upper abd pain it's difficult to control  Stress test done and normal  No N/V, cough  +smoking  No alcohol      BP Readings from Last 3 Encounters:   08/07/20 (!) 135/90   08/05/20 135/88   08/05/20 115/76       O: VS:  height is 6' (1.829 m) and weight is 177 lb (80.3 kg). His temporal temperature is 97 °F (36.1 °C). His blood pressure is 135/90 (abnormal) and his pulse is 83. His respiration is 16 and oxygen saturation is 98%. AAO/NAD, appropriate affect for mood  CV:  RRR, no murmur  Resp: CTAB  Abd: ND, soft, +epigastric tenderness with intentional guarding    Impression/Plan:   1) GERD: Encouraged dietary changes (patient a bit reluctant), encouraged smoking cessation, start PPI, continue pepcid prn for symptoms, keep appt with gen surg, did discuss possible carafate   2) Stop statin, low risk  3) Elevated BP: Dietary/lifestyle changes        Health Maintenance Due   Topic Date Due    Pneumococcal 0-64 years Vaccine (1 of 1 - PPSV23) 03[birth year]06/1965    DTaP/Tdap/Td vaccine (1 - Tdap) 03/06/1978    Shingles Vaccine (1 of 2) 03/06/2009    PSA counseling  04/17/2016    Annual Wellness Visit (AWV)  08/05/2020         Attending Physician Statement  I have discussed the case, including pertinent history and exam findings with the resident. I also have seen the patient and performed key portions of the examination. I agree with the documented assessment and plan.       Jordin Pena MD

## 2020-08-12 ENCOUNTER — OFFICE VISIT (OUTPATIENT)
Dept: SURGERY | Age: 60
End: 2020-08-12
Payer: MEDICARE

## 2020-08-12 VITALS
RESPIRATION RATE: 16 BRPM | BODY MASS INDEX: 24.27 KG/M2 | WEIGHT: 179.2 LBS | HEART RATE: 94 BPM | DIASTOLIC BLOOD PRESSURE: 95 MMHG | TEMPERATURE: 98.1 F | HEIGHT: 72 IN | OXYGEN SATURATION: 96 % | SYSTOLIC BLOOD PRESSURE: 149 MMHG

## 2020-08-12 PROCEDURE — 99203 OFFICE O/P NEW LOW 30 MIN: CPT | Performed by: SURGERY

## 2020-08-17 RX ORDER — SODIUM CHLORIDE 9 MG/ML
INJECTION, SOLUTION INTRAVENOUS CONTINUOUS
Status: CANCELLED | OUTPATIENT
Start: 2020-08-17

## 2020-08-17 ASSESSMENT — ENCOUNTER SYMPTOMS
NAUSEA: 0
SORE THROAT: 0
BACK PAIN: 0
ABDOMINAL PAIN: 1
CONSTIPATION: 0
SHORTNESS OF BREATH: 0
DIARRHEA: 0
BLOOD IN STOOL: 0
PHOTOPHOBIA: 0
WHEEZING: 0
VOMITING: 0
COUGH: 0
EYE REDNESS: 0

## 2020-08-17 NOTE — H&P (VIEW-ONLY)
HISTORY OF PRESENT ILLNESS:    The patient is a 63 y.o. male who presents with complaints of severe reflux. He also complains of epigastric pain. He is also had a recent episode of chest pain. He reports he has had heartburn for years. He recently was started on Prilosec and is experiencing symptom improvement. He has never had an EGD. He denies any family history of esophageal or gastric cancer. Past Medical History:   Diagnosis Date    Acute prostatitis 4/3/2015    4/2015    Chronic back pain     GERD (gastroesophageal reflux disease) 10/10/2014    Herniated nucleus pulposus, L2-3 left     Herniated nucleus pulposus, L4-5     Inguinal hernia 8-14-15    Right, for repair/revision    Left shoulder pain 10/10/2014    Since 9/2014    Right inguinal hernia 10/10/2014    Sprain and strain of lumbosacral joint/ligament        Past Surgical History:   Procedure Laterality Date    BACK SURGERY  7/28/03    LLAM L3-4 left by Dr. Joe Benito COLONOSCOPY  2014     normal    FOOT SURGERY Left 1980's    to remove pinched nerve    HERNIA REPAIR Right 11/18/2014    inguinal- laproscopic    INGUINAL HERNIA REPAIR  8/`14/15    right     MOUTH SURGERY      multiple surgeries    OTHER SURGICAL HISTORY Left 1990's    thumb to repair traumatic injury    OTHER SURGICAL HISTORY Right 11/185/2014    laparoscopic right inguinal hernia repair    TONSILLECTOMY      UPPER GASTROINTESTINAL ENDOSCOPY  2014    normal       Prior to Admission medications    Medication Sig Start Date End Date Taking?  Authorizing Provider   famotidine (PEPCID) 20 MG tablet Take 20 mg by mouth 2 times daily as needed   Yes Historical Provider, MD   aspirin 81 MG chewable tablet Take 1 tablet by mouth daily 8/6/20  Yes Roselyn Fisher MD   omeprazole (PRILOSEC) 40 MG delayed release capsule Take 1 capsule by mouth every morning (before breakfast)  Patient not taking: Reported on 8/12/2020 8/7/20   Kelsey Rose DO Scheduled Meds:  ContinuousInfusions:  PRN Meds:    No Known Allergies    Social History     Socioeconomic History    Marital status: Legally      Spouse name: Not on file    Number of children: Not on file    Years of education: Not on file    Highest education level: Not on file   Occupational History    Not on file   Social Needs    Financial resource strain: Not on file    Food insecurity     Worry: Not on file     Inability: Not on file    Transportation needs     Medical: Not on file     Non-medical: Not on file   Tobacco Use    Smoking status: Current Every Day Smoker     Packs/day: 0.50     Years: 30.00     Pack years: 15.00    Smokeless tobacco: Never Used    Tobacco comment: Trying to quit   Substance and Sexual Activity    Alcohol use: No     Alcohol/week: 0.0 standard drinks     Comment: socially    Drug use: No     Types: Marijuana, Cocaine     Comment: last used of cocaine was 2012, last use marijuana 1999    Sexual activity: Yes     Partners: Female   Lifestyle    Physical activity     Days per week: Not on file     Minutes per session: Not on file    Stress: Not on file   Relationships    Social connections     Talks on phone: Not on file     Gets together: Not on file     Attends [employer] service: Not on file     Active member of club or organization: Not on file     Attends meetings of clubs or organizations: Not on file     Relationship status: Not on file    Intimate partner violence     Fear of current or ex partner: Not on file     Emotionally abused: Not on file     Physically abused: Not on file     Forced sexual activity: Not on file   Other Topics Concern    Not on file   Social History Narrative    Not on file       Family History   Problem Relation Age of Onset    Cancer Father         unknown    Cancer Brother         bladder    Blindness Son         Left eye    Other Son 6        Turret's (movement)    High Blood Pressure Mother        Review Of Systems:   Review of Systems   Constitutional: Negative for chills and fever. HENT: Negative for ear pain, nosebleeds, sore throat and tinnitus. Eyes: Negative for photophobia and redness. Respiratory: Negative for cough, shortness of breath and wheezing. Cardiovascular: Negative for chest pain and palpitations. Gastrointestinal: Positive for abdominal pain. Negative for blood in stool, constipation, diarrhea, nausea and vomiting. Heartburn   Endocrine: Negative for polydipsia. Genitourinary: Negative for dysuria, hematuria and urgency. Musculoskeletal: Negative for back pain and neck pain. Skin: Negative for rash. Neurological: Negative for dizziness, tremors and seizures. Hematological: Does not bruise/bleed easily. All other systems reviewed and are negative.        Physical Exam:  Vitals:    08/12/20 1356   BP: (!) 149/95   Site: Right Upper Arm   Position: Sitting   Cuff Size: Medium Adult   Pulse: 94   Resp: 16   Temp: 98.1 °F (36.7 °C)   TempSrc: Temporal   SpO2: 96%   Weight: 179 lb 3.2 oz (81.3 kg)   Height: 6' (1.829 m)       General: Well nourished, well developed, no acute distress  Eyes:  PERRL   Conjunctiva unremarkable   ENT:  TM's intact bilaterally, no effusion   Nasal:  No mucosal edema     No nasal drainage   Oral:  mucosa moist and pink   Neck:  Supple   No palpable cervical lymphoadenopathy   Thyroid without mass or enlargement  Resp: Lungs CTAB   Equal and adequate air exchange without accessory muscle use   No rales, rhonchi or wheeze  CV: S1S2 RRR   No murmur   Intact distal pulses   No edema  GI: Abdomen Soft, non tender, non distended   Normoactive bowel sounds   No palpable hepatosplenomegaly  MS: Physiologic ROM of all extremities    Intact distal pulses   No clubbing or cyanosis   Skin Warm and dry; no rash or lesion  Neuro: Alert and oriented; normal and intact dtr's   Psych: Euthymic mood, congruent affect      Cta Pulmonary W Contrast    Result Date: perfusion defect. Wall motion is within normal limits. The end diastolic volume is 78 ml. The end systolic volume is 32 ml. The estimated ejection fraction is 59 %. 1. No reversible perfusion defect 2. Ejection fraction is 59 %. 3. No significant wall motion abnormality        Assessment/Plan:  3 70-year-old male with severe GERD, abdominal pain. We shall plan for EGD. The risks and benefits of the procedure were explained to the patient, including risks of bleeding and perforation. The patient expressed understanding of these risks.

## 2020-08-17 NOTE — H&P
Systems:   Review of Systems   Constitutional: Negative for chills and fever. HENT: Negative for ear pain, nosebleeds, sore throat and tinnitus. Eyes: Negative for photophobia and redness. Respiratory: Negative for cough, shortness of breath and wheezing. Cardiovascular: Negative for chest pain and palpitations. Gastrointestinal: Positive for abdominal pain. Negative for blood in stool, constipation, diarrhea, nausea and vomiting. Heartburn   Endocrine: Negative for polydipsia. Genitourinary: Negative for dysuria, hematuria and urgency. Musculoskeletal: Negative for back pain and neck pain. Skin: Negative for rash. Neurological: Negative for dizziness, tremors and seizures. Hematological: Does not bruise/bleed easily. All other systems reviewed and are negative.        Physical Exam:  Vitals:    08/12/20 1356   BP: (!) 149/95   Site: Right Upper Arm   Position: Sitting   Cuff Size: Medium Adult   Pulse: 94   Resp: 16   Temp: 98.1 °F (36.7 °C)   TempSrc: Temporal   SpO2: 96%   Weight: 179 lb 3.2 oz (81.3 kg)   Height: 6' (1.829 m)       General: Well nourished, well developed, no acute distress  Eyes:  PERRL   Conjunctiva unremarkable   ENT:  TM's intact bilaterally, no effusion   Nasal:  No mucosal edema     No nasal drainage   Oral:  mucosa moist and pink   Neck:  Supple   No palpable cervical lymphoadenopathy   Thyroid without mass or enlargement  Resp: Lungs CTAB   Equal and adequate air exchange without accessory muscle use   No rales, rhonchi or wheeze  CV: S1S2 RRR   No murmur   Intact distal pulses   No edema  GI: Abdomen Soft, non tender, non distended   Normoactive bowel sounds   No palpable hepatosplenomegaly  MS: Physiologic ROM of all extremities    Intact distal pulses   No clubbing or cyanosis   Skin Warm and dry; no rash or lesion  Neuro: Alert and oriented; normal and intact dtr's   Psych: Euthymic mood, congruent affect      Cta Pulmonary W Contrast    Result Date: 2020  Patient MRN: [id number] : 1960 Age:  63 years Gender: Male Order Date: 2020 1:28 AM Exam: CTA PULMONARY W CONTRAST Number of Images: 276 views Indication:   chest pain chest pain Comparison: None. Technique: CT of the thorax with contrast with CTA of the pulmonary arteries was obtained. The study was obtained from the thoracic inlet to the base of the diaphragm. Radiation Output: CTDIvol 14.82 (mGy); .53 (mGy-cm) Findings: The lungs demonstrate normal parenchymal architecture. There is no parenchymal mass or infiltrate identified. There is no pleural effusion seen. CTA of the pulmonary arteries demonstrates there is no evidence of pulmonary embolus or defect identified. CTA of the thoracic aorta demonstrates no evidence of thoracic aneurysm or dissection identified. All of the vasculature appears to be normal caliber. There is no aneurysm or other abnormality identified in the other great vessels. The chest wall appears to be normal. There is no axillary lymphadenopathy identified. The visualized portion of the upper abdomen appears to be normal.     1. NORMAL CT OF THE THORAX WITH CTA OF THE PULMONARY ARTERIES WITH CONTRAST ENHANCEMENT. 2. NO EVIDENCE OF PULMONARY EMBOLUS. Nm Cardiac Stress Test Nuclear Imaging    Result Date: 2020  Patient MRN:  [id number] : 1960 Age: 63 years Gender: Male Order Date:  2020 10:51 AM EXAM: NM MYOCARDIAL SPECT REST EXERCISE OR RX Number of Images: 10 views INDICATION:  Chest pain Reason for Exam?->Chest pain Procedure Type->Exercise COMPARISON: None TECHNIQUE: 10.5 mCi of Tc-99m MIBI was injected intravenously at rest and cardiac SPECT images were performed. In addition 32.6 mCi of Tc-99m MIBI was injected intravenously at maximum stress by using treadmill with Daniel protocol. Patient achieved 94% of maximum predicted heart rate. Stress SPECT images and gated study were performed.   FINDINGS: Perfusion images demonstrate no reversible perfusion defect. Wall motion is within normal limits. The end diastolic volume is 78 ml. The end systolic volume is 32 ml. The estimated ejection fraction is 59 %. 1. No reversible perfusion defect 2. Ejection fraction is 59 %. 3. No significant wall motion abnormality        Assessment/Plan:  3 57-year-old male with severe GERD, abdominal pain. We shall plan for EGD. The risks and benefits of the procedure were explained to the patient, including risks of bleeding and perforation. The patient expressed understanding of these risks.

## 2020-08-17 NOTE — PROGRESS NOTES
Consult Note    Dear Sara Waller DO, thank you for referring Wily Giles for evaluation. Reason for Consult: GERD    HISTORY OF PRESENT ILLNESS:    The patient is a 63 y.o. male who presents with complaints of severe reflux. He also complains of epigastric pain. He is also had a recent episode of chest pain. He reports he has had heartburn for years. He recently was started on Prilosec and is experiencing symptom improvement. He has never had an EGD. He denies any family history of esophageal or gastric cancer. Past Medical History:   Diagnosis Date    Acute prostatitis 4/3/2015    4/2015    Chronic back pain     GERD (gastroesophageal reflux disease) 10/10/2014    Herniated nucleus pulposus, L2-3 left     Herniated nucleus pulposus, L4-5     Inguinal hernia 8-14-15    Right, for repair/revision    Left shoulder pain 10/10/2014    Since 9/2014    Right inguinal hernia 10/10/2014    Sprain and strain of lumbosacral joint/ligament        Past Surgical History:   Procedure Laterality Date    BACK SURGERY  7/28/03    LLAM L3-4 left by Dr. Deborah Bray COLONOSCOPY  2014     normal    FOOT SURGERY Left 1980's    to remove pinched nerve    HERNIA REPAIR Right 11/18/2014    inguinal- laproscopic    INGUINAL HERNIA REPAIR  8/`14/15    right     MOUTH SURGERY      multiple surgeries    OTHER SURGICAL HISTORY Left 1990's    thumb to repair traumatic injury    OTHER SURGICAL HISTORY Right 11/185/2014    laparoscopic right inguinal hernia repair    TONSILLECTOMY      UPPER GASTROINTESTINAL ENDOSCOPY  2014    normal       Prior to Admission medications    Medication Sig Start Date End Date Taking?  Authorizing Provider   famotidine (PEPCID) 20 MG tablet Take 20 mg by mouth 2 times daily as needed   Yes Historical Provider, MD   aspirin 81 MG chewable tablet Take 1 tablet by mouth daily 8/6/20  Yes Judit Samaniego MD   omeprazole (PRILOSEC) 40 MG delayed release capsule Take 1 capsule by mouth every morning (before breakfast)  Patient not taking: Reported on 8/12/2020 8/7/20   Alcidesnichelle Oglesby V, DO       Scheduled Meds:  ContinuousInfusions:  PRN Meds:    No Known Allergies    Social History     Socioeconomic History    Marital status: Legally      Spouse name: Not on file    Number of children: Not on file    Years of education: Not on file    Highest education level: Not on file   Occupational History    Not on file   Social Needs    Financial resource strain: Not on file    Food insecurity     Worry: Not on file     Inability: Not on file    Transportation needs     Medical: Not on file     Non-medical: Not on file   Tobacco Use    Smoking status: Current Every Day Smoker     Packs/day: 0.50     Years: 30.00     Pack years: 15.00    Smokeless tobacco: Never Used    Tobacco comment: Trying to quit   Substance and Sexual Activity    Alcohol use: No     Alcohol/week: 0.0 standard drinks     Comment: socially    Drug use: No     Types: Marijuana, Cocaine     Comment: last used of cocaine was 2012, last use marijuana 1999    Sexual activity: Yes     Partners: Female   Lifestyle    Physical activity     Days per week: Not on file     Minutes per session: Not on file    Stress: Not on file   Relationships    Social connections     Talks on phone: Not on file     Gets together: Not on file     Attends [employer] service: Not on file     Active member of club or organization: Not on file     Attends meetings of clubs or organizations: Not on file     Relationship status: Not on file    Intimate partner violence     Fear of current or ex partner: Not on file     Emotionally abused: Not on file     Physically abused: Not on file     Forced sexual activity: Not on file   Other Topics Concern    Not on file   Social History Narrative    Not on file       Family History   Problem Relation Age of Onset    Cancer Father         unknown    Cancer Brother         bladder    Blindness Son         Left eye    Other Son 6        Turret's (movement)    High Blood Pressure Mother        Review Of Systems:   Review of Systems   Constitutional: Negative for chills and fever. HENT: Negative for ear pain, nosebleeds, sore throat and tinnitus. Eyes: Negative for photophobia and redness. Respiratory: Negative for cough, shortness of breath and wheezing. Cardiovascular: Negative for chest pain and palpitations. Gastrointestinal: Positive for abdominal pain. Negative for blood in stool, constipation, diarrhea, nausea and vomiting. Heartburn   Endocrine: Negative for polydipsia. Genitourinary: Negative for dysuria, hematuria and urgency. Musculoskeletal: Negative for back pain and neck pain. Skin: Negative for rash. Neurological: Negative for dizziness, tremors and seizures. Hematological: Does not bruise/bleed easily. All other systems reviewed and are negative.        Physical Exam:  Vitals:    08/12/20 1356   BP: (!) 149/95   Site: Right Upper Arm   Position: Sitting   Cuff Size: Medium Adult   Pulse: 94   Resp: 16   Temp: 98.1 °F (36.7 °C)   TempSrc: Temporal   SpO2: 96%   Weight: 179 lb 3.2 oz (81.3 kg)   Height: 6' (1.829 m)       General: Well nourished, well developed, no acute distress  Eyes:  PERRL   Conjunctiva unremarkable   ENT:  TM's intact bilaterally, no effusion   Nasal:  No mucosal edema     No nasal drainage   Oral:  mucosa moist and pink   Neck:  Supple   No palpable cervical lymphoadenopathy   Thyroid without mass or enlargement  Resp: Lungs CTAB   Equal and adequate air exchange without accessory muscle use   No rales, rhonchi or wheeze  CV: S1S2 RRR   No murmur   Intact distal pulses   No edema  GI: Abdomen Soft, non tender, non distended   Normoactive bowel sounds   No palpable hepatosplenomegaly  MS: Physiologic ROM of all extremities    Intact distal pulses   No clubbing or cyanosis   Skin Warm and dry; no rash or lesion  Neuro: Alert and oriented; normal and intact dtr's   Psych: Euthymic mood, congruent affect      Cta Pulmonary W Contrast    Result Date: 2020  Patient MRN: [id number] : 1960 Age:  63 years Gender: Male Order Date: 2020 1:28 AM Exam: CTA PULMONARY W CONTRAST Number of Images: 611 views Indication:   chest pain chest pain Comparison: None. Technique: CT of the thorax with contrast with CTA of the pulmonary arteries was obtained. The study was obtained from the thoracic inlet to the base of the diaphragm. Radiation Output: CTDIvol 14.82 (mGy); .53 (mGy-cm) Findings: The lungs demonstrate normal parenchymal architecture. There is no parenchymal mass or infiltrate identified. There is no pleural effusion seen. CTA of the pulmonary arteries demonstrates there is no evidence of pulmonary embolus or defect identified. CTA of the thoracic aorta demonstrates no evidence of thoracic aneurysm or dissection identified. All of the vasculature appears to be normal caliber. There is no aneurysm or other abnormality identified in the other great vessels. The chest wall appears to be normal. There is no axillary lymphadenopathy identified. The visualized portion of the upper abdomen appears to be normal.     1. NORMAL CT OF THE THORAX WITH CTA OF THE PULMONARY ARTERIES WITH CONTRAST ENHANCEMENT. 2. NO EVIDENCE OF PULMONARY EMBOLUS. Nm Cardiac Stress Test Nuclear Imaging    Result Date: 2020  Patient MRN:  [id number] : 1960 Age: 63 years Gender: Male Order Date:  2020 10:51 AM EXAM: NM MYOCARDIAL SPECT REST EXERCISE OR RX Number of Images: 10 views INDICATION:  Chest pain Reason for Exam?->Chest pain Procedure Type->Exercise COMPARISON: None TECHNIQUE: 10.5 mCi of Tc-99m MIBI was injected intravenously at rest and cardiac SPECT images were performed. In addition 32.6 mCi of Tc-99m MIBI was injected intravenously at maximum stress by using treadmill with Daniel protocol.  Patient achieved 94% of maximum predicted heart rate. Stress SPECT images and gated study were performed. FINDINGS: Perfusion images demonstrate no reversible perfusion defect. Wall motion is within normal limits. The end diastolic volume is 78 ml. The end systolic volume is 32 ml. The estimated ejection fraction is 59 %. 1. No reversible perfusion defect 2. Ejection fraction is 59 %. 3. No significant wall motion abnormality        Assessment/Plan:  3 34-year-old male with severe GERD, abdominal pain. We shall plan for EGD. The risks and benefits of the procedure were explained to the patient, including risks of bleeding and perforation. The patient expressed understanding of these risks.         Electronically signed by Traci Thompson DO on 8/17/20 at 9:24 AM EDT

## 2020-08-18 ENCOUNTER — TELEPHONE (OUTPATIENT)
Dept: SURGERY | Age: 60
End: 2020-08-18

## 2020-08-18 NOTE — TELEPHONE ENCOUNTER
MA spoke with Chaitanya Conrad at [employer] regarding prior authorization. Stated no authorization was needed for CPT code 27221. Ref # [id number].   Electronically signed by Lolis Rodriguez MA on 8/18/20 at 1:21 PM EDT

## 2020-08-28 ENCOUNTER — HOSPITAL ENCOUNTER (OUTPATIENT)
Age: 60
Discharge: HOME | End: 2020-08-30
Payer: MEDICARE

## 2020-08-28 PROCEDURE — U0003 INFECTIOUS AGENT DETECTION BY NUCLEIC ACID (DNA OR RNA); SEVERE ACUTE RESPIRATORY SYNDROME CORONAVIRUS 2 (SARS-COV-2) (CORONAVIRUS DISEASE [COVID-19]), AMPLIFIED PROBE TECHNIQUE, MAKING USE OF HIGH THROUGHPUT TECHNOLOGIES AS DESCRIBED BY CMS-2020-01-R: HCPCS

## 2020-08-29 LAB
SARS-COV-2: NOT DETECTED
SOURCE: NORMAL

## 2020-08-31 NOTE — PROGRESS NOTES
Have you been tested for COVID  Yes           Have you been told you were positive for COVID No  Have you had any known exposure to someone that is positive for COVID No  Do you have a cough                   No              Do you have shortness of breath No                 Do you have a sore throat            No                Are you having chills                    No                Are you having muscle aches. No                    Please come to the hospital wearing a mask and have your significant other wear a mask as well. Both of you should check your temperature before leaving to come here,  if it is 100 or higher please call [phone] for instruction.

## 2020-08-31 NOTE — PROGRESS NOTES
[facility] PRE-ADMISSION TESTING INSTRUCTIONS    The Preadmission Testing patient is instructed accordingly using the following criteria (check applicable):    ARRIVAL INSTRUCTIONS:  [x] Parking the day of Surgery is located in the Main Entrance lot. Upon entering the door, make an immediate right to the surgery reception desk    [] [phone] is available Monday through Friday 6 am to 6 pm    [x] Bring photo ID and insurance card    [] Bring in a copy of Living will or Durable Power of  papers. [x] Please be sure to arrange for responsible adult to provide transportation to and from the hospital    [x] Please arrange for responsible adult to be with you for the 24 hour period post procedure due to having anesthesia      GENERAL INSTRUCTIONS:    [x] Nothing by mouth after midnight, including gum, candy, mints or water    [x] You may brush your teeth, but do not swallow any water    [x] Take medications as instructed with 1-2 oz of water    [] Stop herbal supplements and vitamins 5 days prior to procedure    [x] Follow preop dosing of blood thinners per physician instructions    [] Take 1/2 dose of evening insulin, but no insulin after midnight    [] No oral diabetic medications after midnight    [] If diabetic and have low blood sugar or feel symptomatic, take 1-2oz apple juice only    [] Bring inhalers day of surgery    [] Bring C-PAP/ Bi-Pap day of surgery    [] Bring urine specimen day of surgery    [x] Shower or bath with soap, lather and rinse well, AM of Surgery, no lotion, powders or creams to surgical site    [] Follow bowel prep as instructed per surgeon    [x] No tobacco products within 24 hours of surgery     [x] No alcohol or illegal drug use within 24 hours of surgery.     [x] Jewelry, body piercing's, eyeglasses, contact lenses and dentures are not permitted into surgery (bring cases)      [x] Please do not wear any nail polish, make up or hair

## 2020-09-03 ENCOUNTER — ANESTHESIA EVENT (OUTPATIENT)
Dept: ENDOSCOPY | Age: 60
End: 2020-09-03
Payer: MEDICARE

## 2020-09-03 ENCOUNTER — HOSPITAL ENCOUNTER (OUTPATIENT)
Age: 60
Setting detail: OUTPATIENT SURGERY
Discharge: HOME | End: 2020-09-03
Attending: SURGERY | Admitting: SURGERY
Payer: MEDICARE

## 2020-09-03 ENCOUNTER — ANESTHESIA (OUTPATIENT)
Dept: ENDOSCOPY | Age: 60
End: 2020-09-03
Payer: MEDICARE

## 2020-09-03 VITALS
RESPIRATION RATE: 16 BRPM | DIASTOLIC BLOOD PRESSURE: 80 MMHG | HEIGHT: 74 IN | SYSTOLIC BLOOD PRESSURE: 124 MMHG | TEMPERATURE: 97.1 F | WEIGHT: 185 LBS | OXYGEN SATURATION: 99 % | BODY MASS INDEX: 23.74 KG/M2 | HEART RATE: 75 BPM

## 2020-09-03 VITALS
SYSTOLIC BLOOD PRESSURE: 137 MMHG | OXYGEN SATURATION: 98 % | DIASTOLIC BLOOD PRESSURE: 89 MMHG | RESPIRATION RATE: 16 BRPM

## 2020-09-03 PROCEDURE — 3700000000 HC ANESTHESIA ATTENDED CARE: Performed by: SURGERY

## 2020-09-03 PROCEDURE — 3609012400 HC EGD TRANSORAL BIOPSY SINGLE/MULTIPLE: Performed by: SURGERY

## 2020-09-03 PROCEDURE — 88305 TISSUE EXAM BY PATHOLOGIST: CPT

## 2020-09-03 PROCEDURE — 6360000002 HC RX W HCPCS: Performed by: NURSE ANESTHETIST, CERTIFIED REGISTERED

## 2020-09-03 PROCEDURE — 2580000003 HC RX 258: Performed by: SURGERY

## 2020-09-03 PROCEDURE — 7100000011 HC PHASE II RECOVERY - ADDTL 15 MIN: Performed by: SURGERY

## 2020-09-03 PROCEDURE — 2709999900 HC NON-CHARGEABLE SUPPLY: Performed by: SURGERY

## 2020-09-03 PROCEDURE — 7100000010 HC PHASE II RECOVERY - FIRST 15 MIN: Performed by: SURGERY

## 2020-09-03 PROCEDURE — 43239 EGD BIOPSY SINGLE/MULTIPLE: CPT | Performed by: SURGERY

## 2020-09-03 PROCEDURE — 3700000001 HC ADD 15 MINUTES (ANESTHESIA): Performed by: SURGERY

## 2020-09-03 PROCEDURE — 88342 IMHCHEM/IMCYTCHM 1ST ANTB: CPT

## 2020-09-03 RX ORDER — OMEPRAZOLE 40 MG/1
40 CAPSULE, DELAYED RELEASE ORAL
Qty: 30 CAPSULE | Refills: 5 | Status: SHIPPED | OUTPATIENT
Start: 2020-09-03 | End: 2021-08-05 | Stop reason: SDUPTHER

## 2020-09-03 RX ORDER — PROPOFOL 10 MG/ML
INJECTION, EMULSION INTRAVENOUS PRN
Status: DISCONTINUED | OUTPATIENT
Start: 2020-09-03 | End: 2020-09-03 | Stop reason: SDUPTHER

## 2020-09-03 RX ORDER — SODIUM CHLORIDE 9 MG/ML
INJECTION, SOLUTION INTRAVENOUS CONTINUOUS
Status: DISCONTINUED | OUTPATIENT
Start: 2020-09-03 | End: 2020-09-03 | Stop reason: HOSPADM

## 2020-09-03 RX ADMIN — SODIUM CHLORIDE: 9 INJECTION, SOLUTION INTRAVENOUS at 08:16

## 2020-09-03 RX ADMIN — PROPOFOL 50 MG: 10 INJECTION, EMULSION INTRAVENOUS at 09:20

## 2020-09-03 RX ADMIN — PROPOFOL 50 MG: 10 INJECTION, EMULSION INTRAVENOUS at 09:21

## 2020-09-03 RX ADMIN — PROPOFOL 50 MG: 10 INJECTION, EMULSION INTRAVENOUS at 09:22

## 2020-09-03 ASSESSMENT — PAIN SCALES - GENERAL: PAINLEVEL_OUTOF10: 0

## 2020-09-03 ASSESSMENT — PAIN - FUNCTIONAL ASSESSMENT
PAIN_FUNCTIONAL_ASSESSMENT: 0-10
PAIN_FUNCTIONAL_ASSESSMENT: PREVENTS OR INTERFERES SOME ACTIVE ACTIVITIES AND ADLS

## 2020-09-03 ASSESSMENT — PAIN DESCRIPTION - DESCRIPTORS: DESCRIPTORS: SHARP

## 2020-09-03 ASSESSMENT — LIFESTYLE VARIABLES: SMOKING_STATUS: 1

## 2020-09-03 NOTE — INTERVAL H&P NOTE
Update History & Physical    The patient's History and Physical was reviewed with the patient and I examined the patient. There was no change. The surgical site was confirmed by the patient and me. Plan: The risks, benefits, expected outcome, and alternative to the recommended procedure have been discussed with the patient. Patient understands and wants to proceed with the procedure.      Electronically signed by Himanshu Brewster DO on 9/3/2020 at 8:56 AM

## 2020-09-03 NOTE — ANESTHESIA PRE PROCEDURE
Department of Anesthesiology  Preprocedure Note       Name:  Jagdeep Irving   Age:  63 y.o.  :  1960                                          MRN:  [id number]         Date:  9/3/2020      Surgeon: Jesenia Sinclair):  Kati Angulo DO    Procedure: Procedure(s):  EGD ESOPHAGOGASTRODUODENOSCOPY    Medications prior to admission:   Prior to Admission medications    Medication Sig Start Date End Date Taking? Authorizing Provider   omeprazole (PRILOSEC) 40 MG delayed release capsule Take 1 capsule by mouth every morning (before breakfast) 20  Yes Shea SLADE DO   aspirin 81 MG chewable tablet Take 1 tablet by mouth daily 20  Yes Kamilah Alvarez MD       Current medications:    Current Facility-Administered Medications   Medication Dose Route Frequency Provider Last Rate Last Dose    0.9 % sodium chloride infusion   Intravenous Continuous Kati Angulo DO           Allergies:  No Known Allergies    Problem List:    Patient Active Problem List   Diagnosis Code    Sub. agg. of pre-existing disc herniation L3-4 M51.26    Sub. agg.  of pre-existing disc herniation L4-5 M51.26    GERD (gastroesophageal reflux disease) K21.9    Left shoulder pain M25.512    Acute prostatitis N41.0    Elevated serum creatinine R79.89    Epigastric pain R10.13       Past Medical History:        Diagnosis Date    Chronic back pain     GERD (gastroesophageal reflux disease) 10/10/2014    Herniated nucleus pulposus, L2-3 left     Herniated nucleus pulposus, L4-5     Inguinal hernia 8-14-15    Right, for repair/revision    Left shoulder pain 10/10/2014    Since 2014    Sprain and strain of lumbosacral joint/ligament        Past Surgical History:        Procedure Laterality Date    BACK SURGERY  03    LLAM L3-4 left by Dr. Dante Angelo COLONOSCOPY       normal    FOOT SURGERY Left     to remove pinched nerve    HERNIA REPAIR Right 2014    inguinal- laproscopic    INGUINAL HERNIA REPAIR  8/`14/15    right     MOUTH SURGERY      multiple surgeries    OTHER SURGICAL HISTORY Left 1990's    thumb to repair traumatic injury    OTHER SURGICAL HISTORY Right 11/185/2014    laparoscopic right inguinal hernia repair    TONSILLECTOMY      UPPER GASTROINTESTINAL ENDOSCOPY  2014    normal       Social History:    Social History     Tobacco Use    Smoking status: Current Every Day Smoker     Packs/day: 0.50     Years: 30.00     Pack years: 15.00    Smokeless tobacco: Never Used    Tobacco comment: Trying to quit   Substance Use Topics    Alcohol use: No     Alcohol/week: 0.0 standard drinks     Comment: socially                                Ready to quit: Not Answered  Counseling given: Not Answered  Comment: Trying to quit      Vital Signs (Current):   Vitals:    08/31/20 1200 09/03/20 0813   BP:  133/80   Pulse:  69   Resp:  20   Temp:  97.3 °F (36.3 °C)   TempSrc:  Temporal   SpO2:  97%   Weight: 185 lb (83.9 kg) 185 lb (83.9 kg)   Height: 6' 2\" (1.88 m) 6' 2\" (1.88 m)                                              BP Readings from Last 3 Encounters:   09/03/20 133/80   08/12/20 (!) 149/95   08/07/20 139/84       NPO Status: Time of last liquid consumption: 0630(SIP WATER WITH MED)                        Time of last solid consumption: 1800                        Date of last liquid consumption: 09/03/20                        Date of last solid food consumption: 09/02/20    BMI:   Wt Readings from Last 3 Encounters:   09/03/20 185 lb (83.9 kg)   08/12/20 179 lb 3.2 oz (81.3 kg)   08/07/20 177 lb (80.3 kg)     Body mass index is 23.75 kg/m².     CBC:   Lab Results   Component Value Date    WBC 14.0 08/05/2020    RBC 5.12 08/05/2020    HGB 16.3 08/05/2020    HCT 45.5 08/05/2020    MCV 88.9 08/05/2020    RDW 12.2 08/05/2020     08/05/2020       CMP:   Lab Results   Component Value Date     08/06/2020    K 4.3 08/06/2020     08/06/2020    CO2 25 08/06/2020    BUN 13 08/06/2020

## 2020-09-03 NOTE — PROGRESS NOTES
Discharge and post anesthesia care instructions given to pt., pt. verbalizes understanding. No other issues identified at discharge, pt to follow up with doctor as directed. Pt has safe transportation to home. Pt will be discharged home with a responsible adult. Pt is escorted from facility by staff member.

## 2020-09-03 NOTE — ANESTHESIA POSTPROCEDURE EVALUATION
Department of Anesthesiology  Postprocedure Note    Patient: Torsten Cummins MRN: [id number]  YOB: 1960  Date of evaluation: 9/3/2020  Time:  2:53 PM     Procedure Summary     Date:  09/03/20 Room / Location:  [place] [address] / [facility]    Anesthesia Start:  1180 Anesthesia Stop:  3274    Procedure:  EGD BIOPSY (N/A ) Diagnosis:  (GERD)    Surgeon:  Aleisha Maradiaga DO Responsible Provider:  Pebbles Hidalgo MD    Anesthesia Type:  MAC ASA Status:  2          Anesthesia Type: MAC    Lucas Phase I: Lucas Score: 10    Lucas Phase II: Lucas Score: 10    Last vitals: Reviewed and per EMR flowsheets.        Anesthesia Post Evaluation    Patient location during evaluation: PACU  Patient participation: complete - patient participated  Level of consciousness: awake and alert  Airway patency: patent  Nausea & Vomiting: no nausea and no vomiting  Complications: no  Cardiovascular status: hemodynamically stable  Respiratory status: acceptable  Hydration status: euvolemic

## 2020-09-03 NOTE — OP NOTE
Operative Note      Patient: Kev Guerra YOB: 1960  MRN: [id number]    Date of Procedure: 9/3/2020    Pre-Op Diagnosis: GERD    Post-Op Diagnosis: Same       Procedure(s):  EGD ESOPHAGOGASTRODUODENOSCOPY with biopsies    Surgeon(s):  Ruddy Martino DO    Assistant:   * No surgical staff found *    Anesthesia: Monitor Anesthesia Care    Estimated Blood Loss (mL): Minimal    Complications: None    Specimens:   ID Type Source Tests Collected by Time Destination   A : antral bx Tissue Stomach SURGICAL PATHOLOGY Ruddy Martino DO 9/3/2020 3241    B : bx distal esophagus Tissue Stomach SURGICAL PATHOLOGY Ruddy Martino,  9/3/2020 5684        Implants:  * No implants in log *      Drains: * No LDAs found *    Findings: Moderate sized hiatus hernia. Early formation of distal esophageal Schatzki's ring. Moderate antral gastritis. Detailed Description of Procedure:   Procedures form endoscopy sooner conscious sedation per by the anesthesia staff. The esophogastric was placed in the oropharynx and advanced in the esophagus at difficulty. The distal esophagus there is early formation of Schatzki's ring noted. Scope was passed easily through this area into the stomach. There is moderate antral gastritis. First and second parts of duodenum were grossly normal.  Scope was retroflexed within the stomach, moderate sized hiatus hernia is observed. Scope was then straightened, antral biopsies were obtained as well as biopsies from the Schatzki's ring. Scope was then removed.     Electronically signed by Ruddy Martino DO on 9/3/2020 at 9:26 AM

## 2020-09-28 ENCOUNTER — OFFICE VISIT (OUTPATIENT)
Dept: SURGERY | Age: 60
End: 2020-09-28
Payer: MEDICARE

## 2020-09-28 VITALS
BODY MASS INDEX: 24.6 KG/M2 | DIASTOLIC BLOOD PRESSURE: 84 MMHG | WEIGHT: 185.6 LBS | SYSTOLIC BLOOD PRESSURE: 136 MMHG | HEART RATE: 88 BPM | TEMPERATURE: 98.2 F | HEIGHT: 73 IN | OXYGEN SATURATION: 96 %

## 2020-09-28 PROCEDURE — 99212 OFFICE O/P EST SF 10 MIN: CPT | Performed by: SURGERY

## 2020-09-30 NOTE — PROGRESS NOTES
Patient presents for follow-up after recent EGD with biopsies. He does report overall he is feeling better. He continues on his omeprazole. He have a moderate size hiatal hernia. He also had a what appeared to be distal Schatzki's rings. He had moderate gastritis. 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 H. pylori was negative. He should be rescoped in 2 years or sooner if other symptoms develop. Continue with omeprazole indefinitely.

## 2021-08-05 ENCOUNTER — TELEPHONE (OUTPATIENT)
Dept: SURGERY | Age: 61
End: 2021-08-05

## 2021-08-05 RX ORDER — OMEPRAZOLE 40 MG/1
40 CAPSULE, DELAYED RELEASE ORAL
Qty: 30 CAPSULE | Refills: 5 | Status: ON HOLD
Start: 2021-08-05 | End: 2021-12-21 | Stop reason: SDUPTHER

## 2021-09-07 ENCOUNTER — HOSPITAL ENCOUNTER (EMERGENCY)
Age: 61
Discharge: HOME | End: 2021-09-07
Payer: MEDICARE

## 2021-09-07 VITALS
OXYGEN SATURATION: 99 % | TEMPERATURE: 97.8 F | HEIGHT: 74 IN | HEART RATE: 90 BPM | BODY MASS INDEX: 23.74 KG/M2 | DIASTOLIC BLOOD PRESSURE: 88 MMHG | RESPIRATION RATE: 16 BRPM | SYSTOLIC BLOOD PRESSURE: 130 MMHG | WEIGHT: 185 LBS

## 2021-09-07 DIAGNOSIS — S81.012A KNEE LACERATION, LEFT, INITIAL ENCOUNTER: Primary | ICD-10-CM

## 2021-09-07 PROCEDURE — 12002 RPR S/N/AX/GEN/TRNK2.6-7.5CM: CPT

## 2021-09-07 PROCEDURE — 99282 EMERGENCY DEPT VISIT SF MDM: CPT

## 2021-09-07 RX ORDER — DIAPER,BRIEF,INFANT-TODD,DISP
EACH MISCELLANEOUS ONCE
Status: DISCONTINUED | OUTPATIENT
Start: 2021-09-07 | End: 2021-09-07 | Stop reason: HOSPADM

## 2021-09-07 RX ORDER — CEPHALEXIN 500 MG/1
500 CAPSULE ORAL 3 TIMES DAILY
Qty: 30 CAPSULE | Refills: 0 | Status: SHIPPED | OUTPATIENT
Start: 2021-09-07 | End: 2021-09-17

## 2021-09-07 RX ORDER — BACITRACIN ZINC AND POLYMYXIN B SULFATE 500; 1000 [USP'U]/G; [USP'U]/G
OINTMENT TOPICAL
Qty: 30 G | Refills: 0 | Status: SHIPPED | OUTPATIENT
Start: 2021-09-07 | End: 2021-09-14

## 2021-09-07 RX ORDER — LIDOCAINE HYDROCHLORIDE AND EPINEPHRINE 10; 10 MG/ML; UG/ML
20 INJECTION, SOLUTION INFILTRATION; PERINEURAL ONCE
Status: DISCONTINUED | OUTPATIENT
Start: 2021-09-07 | End: 2021-09-07 | Stop reason: HOSPADM

## 2021-09-07 ASSESSMENT — PAIN DESCRIPTION - ORIENTATION: ORIENTATION: LEFT

## 2021-09-07 ASSESSMENT — PAIN DESCRIPTION - DESCRIPTORS: DESCRIPTORS: BURNING

## 2021-09-07 ASSESSMENT — PAIN DESCRIPTION - FREQUENCY: FREQUENCY: CONTINUOUS

## 2021-09-07 ASSESSMENT — PAIN DESCRIPTION - PROGRESSION: CLINICAL_PROGRESSION: GRADUALLY WORSENING

## 2021-09-07 ASSESSMENT — PAIN DESCRIPTION - LOCATION: LOCATION: KNEE

## 2021-09-07 ASSESSMENT — PAIN DESCRIPTION - PAIN TYPE: TYPE: ACUTE PAIN

## 2021-09-07 ASSESSMENT — PAIN SCALES - GENERAL: PAINLEVEL_OUTOF10: 7

## 2021-09-07 ASSESSMENT — PAIN DESCRIPTION - ONSET: ONSET: SUDDEN

## 2021-09-07 NOTE — ED NOTES
Called patient at home, left a message to call back regarding his ED visit. Pt needs to be notified of the medications sent to pharmacy for him to .       Ashok Metz RN  09/07/21 2999

## 2021-09-07 NOTE — ED PROVIDER NOTES
[address] [place] [address] Drive,[facility] [address]  Department of Emergency Medicine   ED  Encounter Note  Admit Date/RoomTime: 2021 10:27 AM  ED Room: NORA/NORA    NAME: Mirela Luna Sr.  : 1960  MRN: [id number]     Chief Complaint:  Laceration (chainsaw to the left knee. )    History of Present Illness       Mirela Luna Sr. is a 57 y.o. old male who presents to the emergency department by private vehicle, for traumatic injury to the left knee. Patient states that he was cutting down brushed with a chainsaw when the chainsaw kicked back and cut him in the left knee. Patient states that this happened approximately 5 hours ago he states that he had to finish the job before he came to the emergency department. Patient states that he applied paper towels and duct tape to the area which helped him finish his work. Patient denies any numbness tingling or weakness to the area he states that bleeding is controlled. Patient states that he is easily able to ambulate and bear weight on the knee. Tetanus Status:unknown    ROS   Pertinent positives and negatives are stated within HPI, all other systems reviewed and are negative. Past Medical History:  has a past medical history of Chronic back pain, GERD (gastroesophageal reflux disease), Herniated nucleus pulposus, L2-3 left, Herniated nucleus pulposus, L4-5, Inguinal hernia, Left shoulder pain, and Sprain and strain of lumbosacral joint/ligament. Surgical History:  has a past surgical history that includes back surgery (03); Foot surgery (Left, ); Mouth surgery; Tonsillectomy; Upper gastrointestinal endoscopy (); other surgical history (Left, ); other surgical history (Right, ); hernia repair (Right, 2014); Colonoscopy ( ); Inguinal hernia repair (8/`14/15); and Upper gastrointestinal endoscopy (N/A, 9/3/2020). Social History:  reports that he has been smoking. He has a 15.00 pack-year smoking history.  He has never used smokeless tobacco. He reports previous drug use. Drugs: Marijuana and Cocaine. He reports that he does not drink alcohol. Family History: family history includes Blindness in his son; Cancer in his brother and father; High Blood Pressure in his mother; Other (age of onset: 10) in his son. Allergies: Patient has no known allergies. Physical Exam   Oxygen Saturation Interpretation: Normal.        ED Triage Vitals   BP Temp Temp Source Pulse Resp SpO2 Height Weight   09/07/21 1025 09/07/21 1025 09/07/21 1025 09/07/21 1025 09/07/21 1025 09/07/21 1025 09/07/21 1056 09/07/21 1056   130/88 97.8 °F (36.6 °C) Temporal 110 18 99 % 6' 2\" (1.88 m) 185 lb (83.9 kg)         Constitutional:  Alert, development consistent with age. Neck:  Normal ROM. Supple. Left Knee:inferior anterior            Tenderness:  mild. .              Swelling/Effusion: None. Deformity: no deformity observed/palpated. ROM: full range of motion. Skin: 4 cm linear laceration to the inferior portion of the anterior left knee bleeding is controlled there is no involvement of tendon or muscle there is no foreign body visualized. .       Neurovascular: Motor deficit: none. Sensory deficit: none. Pulse deficit: none. Capillary refill: normal.  Gait:  normal.  Lymphatics: No lymphangitis or adenopathy noted. Neurological:  Oriented. Motor functions intact. Lab / Imaging Results   (All laboratory and radiology results have been personally reviewed by myself)  Labs:  No results found for this visit on 09/07/21. Imaging: All Radiology results interpreted by Radiologist unless otherwise noted.   No orders to display     ED Course / Medical Decision Making   Medications - No data to display     Consult(s):   None    Procedure(s):     LACERATION REPAIR  PROCEDURE NOTE:  Unless otherwise indicated, this procedure was done or directly supervised by the ED attending. Laceration #: 1. Location: left knee  Length: 4 cm. The wound area was irrigated with sterile saline, cleansed with povidone iodine and draped in a sterile fashion. The wound area was anesthetized with Lidocaine 1% with epinephrine. WOUND COMPLEXITY:    Debridement: None. Undermining: None. Wound Margins Revised: yes. Flaps Aligned: yes. The wound was explored with the following results no foreign body or tendon injury seen. The wound was closed with 4-0 Ethilon using interrupted sutures. Dressing:  bacitracin and a sterile dressing was placed. Total number suture: 5. MDM:     Imaging was not obtained based patient declining x-ray. Patient was offered an x-ray based on the extent of the injury. Also to evaluate bone structure any foreign body. Patient declined and stated that he did not feel it was necessary. Patient was placed on oral antibiotics he was given a dose of Keflex in the emergency department. Patient's wound was copiously irrigated with sterile water and povidone iodine. Wound was explored there was no foreign body visualized. Wound was sutured and bacitracin ointment with a dry sterile dressing was applied. Plan is subsequently for symptom control, limited use and  immobilization with appropriate outpatient follow-up. Plan of Care/Counseling:  GWENDOLYN Powers CNP reviewed today's visit with the patient in addition to providing specific details for the plan of care and counseling regarding the diagnosis and prognosis. Questions are answered at this time and are agreeable with the plan. Assessment      1. Knee laceration, left, initial encounter      Plan   Discharged home.   Patient condition is good    New Medications     Discharge Medication List as of 9/7/2021  3:17 PM      START taking these medications    Details   cephALEXin (KEFLEX) 500 MG capsule Take 1 capsule by mouth 3 times daily for 10 days, Disp-30 capsule, R-0Normal bacitracin-polymyxin b (POLYSPORIN) [id number] UNIT/GM ointment APPLY TO AFFECTED AREA BID, Disp-30 g, R-0, Normal           Electronically signed by GWENDOLYN Higuera CNP   DD: 9/8/21  **This report was transcribed using voice recognition software. Every effort was made to ensure accuracy; however, inadvertent computerized transcription errors may be present.   END OF ED PROVIDER NOTE       GWENDOLYN Reid CNP  09/08/21 9541

## 2021-09-19 ENCOUNTER — APPOINTMENT (OUTPATIENT)
Dept: CT IMAGING | Age: 61
End: 2021-09-19
Payer: MEDICARE

## 2021-09-19 ENCOUNTER — HOSPITAL ENCOUNTER (INPATIENT)
Age: 61
LOS: 3 days | Discharge: HOME | DRG: 419 | End: 2021-09-22
Attending: SURGERY | Admitting: SURGERY
Payer: MEDICARE

## 2021-09-19 ENCOUNTER — HOSPITAL ENCOUNTER (EMERGENCY)
Age: 61
Discharge: ANOTHER HOSPITAL | End: 2021-09-19
Attending: EMERGENCY MEDICINE
Payer: MEDICARE

## 2021-09-19 ENCOUNTER — APPOINTMENT (OUTPATIENT)
Dept: GENERAL RADIOLOGY | Age: 61
End: 2021-09-19
Payer: MEDICARE

## 2021-09-19 VITALS
WEIGHT: 185 LBS | TEMPERATURE: 96.8 F | DIASTOLIC BLOOD PRESSURE: 80 MMHG | SYSTOLIC BLOOD PRESSURE: 120 MMHG | RESPIRATION RATE: 20 BRPM | HEIGHT: 74 IN | BODY MASS INDEX: 23.74 KG/M2 | HEART RATE: 70 BPM | OXYGEN SATURATION: 98 %

## 2021-09-19 DIAGNOSIS — K44.9 HIATAL HERNIA: ICD-10-CM

## 2021-09-19 DIAGNOSIS — Z90.49 S/P LAPAROSCOPIC CHOLECYSTECTOMY: Primary | ICD-10-CM

## 2021-09-19 DIAGNOSIS — Z48.02 ENCOUNTER FOR REMOVAL OF SUTURES: ICD-10-CM

## 2021-09-19 DIAGNOSIS — K81.0 ACUTE CHOLECYSTITIS: Primary | ICD-10-CM

## 2021-09-19 PROBLEM — K83.09 CHOLEDOCHITIS: Status: ACTIVE | Noted: 2021-09-19

## 2021-09-19 PROBLEM — K80.00 ACUTE CHOLECYSTITIS DUE TO BILIARY CALCULUS: Status: ACTIVE | Noted: 2021-09-19

## 2021-09-19 PROBLEM — K80.50 CHOLEDOCHOLITHIASIS: Status: ACTIVE | Noted: 2021-09-19

## 2021-09-19 LAB
ALBUMIN SERPL-MCNC: 4.4 G/DL (ref 3.5–5.2)
ALP BLD-CCNC: 108 U/L (ref 40–129)
ALT SERPL-CCNC: 16 U/L (ref 0–40)
ANION GAP SERPL CALCULATED.3IONS-SCNC: 12 MMOL/L (ref 7–16)
AST SERPL-CCNC: 21 U/L (ref 0–39)
BASOPHILS ABSOLUTE: 0.11 E9/L (ref 0–0.2)
BASOPHILS RELATIVE PERCENT: 0.8 % (ref 0–2)
BILIRUB SERPL-MCNC: 0.3 MG/DL (ref 0–1.2)
BUN BLDV-MCNC: 12 MG/DL (ref 6–23)
CALCIUM SERPL-MCNC: 10 MG/DL (ref 8.6–10.2)
CHLORIDE BLD-SCNC: 101 MMOL/L (ref 98–107)
CO2: 27 MMOL/L (ref 22–29)
CREAT SERPL-MCNC: 1.1 MG/DL (ref 0.7–1.2)
D DIMER: 397 NG/ML DDU
EKG ATRIAL RATE: 50 BPM
EKG ATRIAL RATE: 88 BPM
EKG P AXIS: 29 DEGREES
EKG P AXIS: 44 DEGREES
EKG P-R INTERVAL: 132 MS
EKG P-R INTERVAL: 136 MS
EKG Q-T INTERVAL: 446 MS
EKG Q-T INTERVAL: 486 MS
EKG QRS DURATION: 96 MS
EKG QRS DURATION: 98 MS
EKG QTC CALCULATION (BAZETT): 443 MS
EKG QTC CALCULATION (BAZETT): 539 MS
EKG R AXIS: 10 DEGREES
EKG R AXIS: 5 DEGREES
EKG T AXIS: 29 DEGREES
EKG T AXIS: 39 DEGREES
EKG VENTRICULAR RATE: 50 BPM
EKG VENTRICULAR RATE: 88 BPM
EOSINOPHILS ABSOLUTE: 0.33 E9/L (ref 0.05–0.5)
EOSINOPHILS RELATIVE PERCENT: 2.5 % (ref 0–6)
GFR AFRICAN AMERICAN: >60
GFR NON-AFRICAN AMERICAN: >60 ML/MIN/1.73
GLUCOSE BLD-MCNC: 148 MG/DL (ref 74–99)
HCT VFR BLD CALC: 47.3 % (ref 37–54)
HEMOGLOBIN: 16.6 G/DL (ref 12.5–16.5)
IMMATURE GRANULOCYTES #: 0.05 E9/L
IMMATURE GRANULOCYTES %: 0.4 % (ref 0–5)
LACTIC ACID: 2.7 MMOL/L (ref 0.5–2.2)
LIPASE: 37 U/L (ref 13–60)
LYMPHOCYTES ABSOLUTE: 1.9 E9/L (ref 1.5–4)
LYMPHOCYTES RELATIVE PERCENT: 14.5 % (ref 20–42)
MAGNESIUM: 1.7 MG/DL (ref 1.6–2.6)
MCH RBC QN AUTO: 31 PG (ref 26–35)
MCHC RBC AUTO-ENTMCNC: 35.1 % (ref 32–34.5)
MCV RBC AUTO: 88.4 FL (ref 80–99.9)
MONOCYTES ABSOLUTE: 0.75 E9/L (ref 0.1–0.95)
MONOCYTES RELATIVE PERCENT: 5.7 % (ref 2–12)
NEUTROPHILS ABSOLUTE: 9.95 E9/L (ref 1.8–7.3)
NEUTROPHILS RELATIVE PERCENT: 76.1 % (ref 43–80)
PDW BLD-RTO: 11.9 FL (ref 11.5–15)
PLATELET # BLD: 344 E9/L (ref 130–450)
PMV BLD AUTO: 11.2 FL (ref 7–12)
POTASSIUM SERPL-SCNC: 4.3 MMOL/L (ref 3.5–5)
PRO-BNP: 151 PG/ML (ref 0–125)
RBC # BLD: 5.35 E12/L (ref 3.8–5.8)
SODIUM BLD-SCNC: 140 MMOL/L (ref 132–146)
TOTAL PROTEIN: 7.7 G/DL (ref 6.4–8.3)
TROPONIN, HIGH SENSITIVITY: 6 NG/L (ref 0–11)
TROPONIN, HIGH SENSITIVITY: 7 NG/L (ref 0–11)
WBC # BLD: 13.1 E9/L (ref 4.5–11.5)

## 2021-09-19 PROCEDURE — 96372 THER/PROPH/DIAG INJ SC/IM: CPT

## 2021-09-19 PROCEDURE — 2500000003 HC RX 250 WO HCPCS: Performed by: TRANSPLANT SURGERY

## 2021-09-19 PROCEDURE — 83690 ASSAY OF LIPASE: CPT

## 2021-09-19 PROCEDURE — 93010 ELECTROCARDIOGRAM REPORT: CPT | Performed by: INTERNAL MEDICINE

## 2021-09-19 PROCEDURE — 85378 FIBRIN DEGRADE SEMIQUANT: CPT

## 2021-09-19 PROCEDURE — C9113 INJ PANTOPRAZOLE SODIUM, VIA: HCPCS | Performed by: EMERGENCY MEDICINE

## 2021-09-19 PROCEDURE — 83735 ASSAY OF MAGNESIUM: CPT

## 2021-09-19 PROCEDURE — 96375 TX/PRO/DX INJ NEW DRUG ADDON: CPT

## 2021-09-19 PROCEDURE — 6360000004 HC RX CONTRAST MEDICATION: Performed by: RADIOLOGY

## 2021-09-19 PROCEDURE — 1200000000 HC SEMI PRIVATE

## 2021-09-19 PROCEDURE — 80053 COMPREHEN METABOLIC PANEL: CPT

## 2021-09-19 PROCEDURE — 6360000002 HC RX W HCPCS: Performed by: EMERGENCY MEDICINE

## 2021-09-19 PROCEDURE — 6370000000 HC RX 637 (ALT 250 FOR IP): Performed by: EMERGENCY MEDICINE

## 2021-09-19 PROCEDURE — 6360000002 HC RX W HCPCS: Performed by: TRANSPLANT SURGERY

## 2021-09-19 PROCEDURE — 99285 EMERGENCY DEPT VISIT HI MDM: CPT

## 2021-09-19 PROCEDURE — 96374 THER/PROPH/DIAG INJ IV PUSH: CPT

## 2021-09-19 PROCEDURE — 6370000000 HC RX 637 (ALT 250 FOR IP): Performed by: TRANSPLANT SURGERY

## 2021-09-19 PROCEDURE — 2580000003 HC RX 258: Performed by: TRANSPLANT SURGERY

## 2021-09-19 PROCEDURE — 74176 CT ABD & PELVIS W/O CONTRAST: CPT

## 2021-09-19 PROCEDURE — 83605 ASSAY OF LACTIC ACID: CPT

## 2021-09-19 PROCEDURE — 2580000003 HC RX 258: Performed by: EMERGENCY MEDICINE

## 2021-09-19 PROCEDURE — 71275 CT ANGIOGRAPHY CHEST: CPT

## 2021-09-19 PROCEDURE — 83880 ASSAY OF NATRIURETIC PEPTIDE: CPT

## 2021-09-19 PROCEDURE — 85025 COMPLETE CBC W/AUTO DIFF WBC: CPT

## 2021-09-19 PROCEDURE — 93005 ELECTROCARDIOGRAM TRACING: CPT | Performed by: EMERGENCY MEDICINE

## 2021-09-19 PROCEDURE — 96365 THER/PROPH/DIAG IV INF INIT: CPT

## 2021-09-19 PROCEDURE — 36415 COLL VENOUS BLD VENIPUNCTURE: CPT

## 2021-09-19 PROCEDURE — C9113 INJ PANTOPRAZOLE SODIUM, VIA: HCPCS | Performed by: TRANSPLANT SURGERY

## 2021-09-19 PROCEDURE — 71045 X-RAY EXAM CHEST 1 VIEW: CPT

## 2021-09-19 PROCEDURE — 84484 ASSAY OF TROPONIN QUANT: CPT

## 2021-09-19 PROCEDURE — 2500000003 HC RX 250 WO HCPCS: Performed by: EMERGENCY MEDICINE

## 2021-09-19 RX ORDER — NITROGLYCERIN 0.4 MG/1
0.4 TABLET SUBLINGUAL EVERY 5 MIN PRN
Status: COMPLETED | OUTPATIENT
Start: 2021-09-19 | End: 2021-09-19

## 2021-09-19 RX ORDER — PANTOPRAZOLE SODIUM 40 MG/10ML
40 INJECTION, POWDER, LYOPHILIZED, FOR SOLUTION INTRAVENOUS ONCE
Status: COMPLETED | OUTPATIENT
Start: 2021-09-19 | End: 2021-09-19

## 2021-09-19 RX ORDER — PANTOPRAZOLE SODIUM 40 MG/10ML
40 INJECTION, POWDER, LYOPHILIZED, FOR SOLUTION INTRAVENOUS DAILY
Status: DISCONTINUED | OUTPATIENT
Start: 2021-09-19 | End: 2021-09-22 | Stop reason: HOSPADM

## 2021-09-19 RX ORDER — POTASSIUM CHLORIDE 20 MEQ/1
40 TABLET, EXTENDED RELEASE ORAL PRN
Status: DISCONTINUED | OUTPATIENT
Start: 2021-09-19 | End: 2021-09-22 | Stop reason: HOSPADM

## 2021-09-19 RX ORDER — ASPIRIN 81 MG/1
324 TABLET, CHEWABLE ORAL ONCE
Status: COMPLETED | OUTPATIENT
Start: 2021-09-19 | End: 2021-09-19

## 2021-09-19 RX ORDER — ACETAMINOPHEN 325 MG/1
650 TABLET ORAL EVERY 6 HOURS
Status: DISCONTINUED | OUTPATIENT
Start: 2021-09-19 | End: 2021-09-22 | Stop reason: HOSPADM

## 2021-09-19 RX ORDER — SODIUM CHLORIDE 9 MG/ML
INJECTION, SOLUTION INTRAVENOUS CONTINUOUS
Status: DISCONTINUED | OUTPATIENT
Start: 2021-09-19 | End: 2021-09-19 | Stop reason: HOSPADM

## 2021-09-19 RX ORDER — 0.9 % SODIUM CHLORIDE 0.9 %
1000 INTRAVENOUS SOLUTION INTRAVENOUS ONCE
Status: COMPLETED | OUTPATIENT
Start: 2021-09-19 | End: 2021-09-19

## 2021-09-19 RX ORDER — SODIUM CHLORIDE 9 MG/ML
10 INJECTION INTRAVENOUS DAILY
Status: DISCONTINUED | OUTPATIENT
Start: 2021-09-19 | End: 2021-09-22 | Stop reason: HOSPADM

## 2021-09-19 RX ORDER — POTASSIUM CHLORIDE 7.45 MG/ML
10 INJECTION INTRAVENOUS PRN
Status: DISCONTINUED | OUTPATIENT
Start: 2021-09-19 | End: 2021-09-22 | Stop reason: HOSPADM

## 2021-09-19 RX ORDER — SODIUM CHLORIDE 0.9 % (FLUSH) 0.9 %
5-40 SYRINGE (ML) INJECTION PRN
Status: DISCONTINUED | OUTPATIENT
Start: 2021-09-19 | End: 2021-09-22 | Stop reason: HOSPADM

## 2021-09-19 RX ORDER — ONDANSETRON 4 MG/1
4 TABLET, ORALLY DISINTEGRATING ORAL EVERY 8 HOURS PRN
Status: DISCONTINUED | OUTPATIENT
Start: 2021-09-19 | End: 2021-09-22 | Stop reason: HOSPADM

## 2021-09-19 RX ORDER — MORPHINE SULFATE 4 MG/ML
4 INJECTION, SOLUTION INTRAMUSCULAR; INTRAVENOUS ONCE
Status: COMPLETED | OUTPATIENT
Start: 2021-09-19 | End: 2021-09-19

## 2021-09-19 RX ORDER — KETOROLAC TROMETHAMINE 30 MG/ML
30 INJECTION, SOLUTION INTRAMUSCULAR; INTRAVENOUS ONCE
Status: COMPLETED | OUTPATIENT
Start: 2021-09-19 | End: 2021-09-19

## 2021-09-19 RX ORDER — FENTANYL CITRATE 50 UG/ML
50 INJECTION, SOLUTION INTRAMUSCULAR; INTRAVENOUS ONCE
Status: COMPLETED | OUTPATIENT
Start: 2021-09-19 | End: 2021-09-19

## 2021-09-19 RX ORDER — SODIUM CHLORIDE 0.9 % (FLUSH) 0.9 %
5-40 SYRINGE (ML) INJECTION EVERY 12 HOURS SCHEDULED
Status: DISCONTINUED | OUTPATIENT
Start: 2021-09-19 | End: 2021-09-22 | Stop reason: HOSPADM

## 2021-09-19 RX ORDER — ONDANSETRON 2 MG/ML
4 INJECTION INTRAMUSCULAR; INTRAVENOUS ONCE
Status: COMPLETED | OUTPATIENT
Start: 2021-09-19 | End: 2021-09-19

## 2021-09-19 RX ORDER — SODIUM CHLORIDE, SODIUM LACTATE, POTASSIUM CHLORIDE, CALCIUM CHLORIDE 600; 310; 30; 20 MG/100ML; MG/100ML; MG/100ML; MG/100ML
INJECTION, SOLUTION INTRAVENOUS CONTINUOUS
Status: DISCONTINUED | OUTPATIENT
Start: 2021-09-19 | End: 2021-09-21

## 2021-09-19 RX ORDER — FENTANYL CITRATE 50 UG/ML
50 INJECTION, SOLUTION INTRAMUSCULAR; INTRAVENOUS ONCE
Status: DISCONTINUED | OUTPATIENT
Start: 2021-09-19 | End: 2021-09-19 | Stop reason: HOSPADM

## 2021-09-19 RX ORDER — MAGNESIUM SULFATE IN WATER 40 MG/ML
2000 INJECTION, SOLUTION INTRAVENOUS PRN
Status: DISCONTINUED | OUTPATIENT
Start: 2021-09-19 | End: 2021-09-22 | Stop reason: HOSPADM

## 2021-09-19 RX ORDER — SODIUM CHLORIDE 9 MG/ML
25 INJECTION, SOLUTION INTRAVENOUS PRN
Status: DISCONTINUED | OUTPATIENT
Start: 2021-09-19 | End: 2021-09-22 | Stop reason: HOSPADM

## 2021-09-19 RX ORDER — ONDANSETRON 2 MG/ML
4 INJECTION INTRAMUSCULAR; INTRAVENOUS EVERY 6 HOURS PRN
Status: DISCONTINUED | OUTPATIENT
Start: 2021-09-19 | End: 2021-09-22 | Stop reason: HOSPADM

## 2021-09-19 RX ADMIN — SODIUM CHLORIDE: 9 INJECTION, SOLUTION INTRAVENOUS at 10:04

## 2021-09-19 RX ADMIN — METRONIDAZOLE 500 MG: 500 INJECTION, SOLUTION INTRAVENOUS at 10:05

## 2021-09-19 RX ADMIN — ASPIRIN 81 MG CHEWABLE TABLET 324 MG: 81 TABLET CHEWABLE at 06:44

## 2021-09-19 RX ADMIN — MAGNESIUM HYDROXIDE/ALUMINUM HYDROXICE/SIMETHICONE: 120; 1200; 1200 SUSPENSION ORAL at 06:48

## 2021-09-19 RX ADMIN — PANTOPRAZOLE SODIUM 40 MG: 40 INJECTION, POWDER, FOR SOLUTION INTRAVENOUS at 06:46

## 2021-09-19 RX ADMIN — MORPHINE SULFATE 4 MG: 4 INJECTION, SOLUTION INTRAMUSCULAR; INTRAVENOUS at 07:19

## 2021-09-19 RX ADMIN — SODIUM CHLORIDE 1000 ML: 9 INJECTION, SOLUTION INTRAVENOUS at 06:50

## 2021-09-19 RX ADMIN — SODIUM CHLORIDE, POTASSIUM CHLORIDE, SODIUM LACTATE AND CALCIUM CHLORIDE: 600; 310; 30; 20 INJECTION, SOLUTION INTRAVENOUS at 13:46

## 2021-09-19 RX ADMIN — NITROGLYCERIN 0.4 MG: 0.4 TABLET, ORALLY DISINTEGRATING SUBLINGUAL at 06:54

## 2021-09-19 RX ADMIN — FENTANYL CITRATE 50 MCG: 50 INJECTION INTRAMUSCULAR; INTRAVENOUS at 08:02

## 2021-09-19 RX ADMIN — ONDANSETRON 4 MG: 2 INJECTION INTRAMUSCULAR; INTRAVENOUS at 08:03

## 2021-09-19 RX ADMIN — NITROGLYCERIN 0.4 MG: 0.4 TABLET, ORALLY DISINTEGRATING SUBLINGUAL at 07:00

## 2021-09-19 RX ADMIN — METRONIDAZOLE 500 MG: 500 INJECTION, SOLUTION INTRAVENOUS at 18:46

## 2021-09-19 RX ADMIN — ENOXAPARIN SODIUM 40 MG: 40 INJECTION SUBCUTANEOUS at 13:45

## 2021-09-19 RX ADMIN — CEFTRIAXONE 1000 MG: 1 INJECTION, POWDER, FOR SOLUTION INTRAMUSCULAR; INTRAVENOUS at 10:00

## 2021-09-19 RX ADMIN — SODIUM CHLORIDE, PRESERVATIVE FREE 10 ML: 5 INJECTION INTRAVENOUS at 13:46

## 2021-09-19 RX ADMIN — SODIUM CHLORIDE, PRESERVATIVE FREE 10 ML: 5 INJECTION INTRAVENOUS at 20:10

## 2021-09-19 RX ADMIN — PANTOPRAZOLE SODIUM 40 MG: 40 INJECTION, POWDER, FOR SOLUTION INTRAVENOUS at 13:46

## 2021-09-19 RX ADMIN — ACETAMINOPHEN 650 MG: 325 TABLET ORAL at 20:08

## 2021-09-19 RX ADMIN — IOPAMIDOL 75 ML: 755 INJECTION, SOLUTION INTRAVENOUS at 07:41

## 2021-09-19 RX ADMIN — NITROGLYCERIN 0.4 MG: 0.4 TABLET, ORALLY DISINTEGRATING SUBLINGUAL at 06:45

## 2021-09-19 RX ADMIN — FENTANYL CITRATE 50 MCG: 50 INJECTION INTRAMUSCULAR; INTRAVENOUS at 10:08

## 2021-09-19 RX ADMIN — SODIUM CHLORIDE, POTASSIUM CHLORIDE, SODIUM LACTATE AND CALCIUM CHLORIDE: 600; 310; 30; 20 INJECTION, SOLUTION INTRAVENOUS at 23:00

## 2021-09-19 RX ADMIN — KETOROLAC TROMETHAMINE 30 MG: 30 INJECTION, SOLUTION INTRAMUSCULAR at 10:03

## 2021-09-19 ASSESSMENT — PAIN DESCRIPTION - DESCRIPTORS: DESCRIPTORS: PRESSURE;CONSTANT

## 2021-09-19 ASSESSMENT — PAIN DESCRIPTION - ONSET: ONSET: GRADUAL

## 2021-09-19 ASSESSMENT — PAIN DESCRIPTION - PROGRESSION: CLINICAL_PROGRESSION: GRADUALLY WORSENING

## 2021-09-19 ASSESSMENT — PAIN SCALES - GENERAL
PAINLEVEL_OUTOF10: 10
PAINLEVEL_OUTOF10: 10
PAINLEVEL_OUTOF10: 7
PAINLEVEL_OUTOF10: 0
PAINLEVEL_OUTOF10: 10
PAINLEVEL_OUTOF10: 0
PAINLEVEL_OUTOF10: 7
PAINLEVEL_OUTOF10: 0
PAINLEVEL_OUTOF10: 0

## 2021-09-19 ASSESSMENT — PAIN DESCRIPTION - PAIN TYPE: TYPE: ACUTE PAIN

## 2021-09-19 ASSESSMENT — PAIN DESCRIPTION - LOCATION: LOCATION: CHEST

## 2021-09-19 ASSESSMENT — PAIN DESCRIPTION - FREQUENCY: FREQUENCY: CONTINUOUS

## 2021-09-19 ASSESSMENT — PAIN DESCRIPTION - ORIENTATION: ORIENTATION: MID

## 2021-09-19 NOTE — ED NOTES
Patient leaving to [address] [place] [address] room 510a with [facility] ambulance company stable at this time      Meagan Almeida RN  09/19/21 3738

## 2021-09-19 NOTE — ED NOTES
Removed 10 sutures from left knee patient states he did have 12 but 2 came out      [facility]  09/19/21 7115

## 2021-09-19 NOTE — Clinical Note
Patient Class: Inpatient [101]   REQUIRED: Diagnosis: Choledochitis [833182]   Estimated Length of Stay: Estimated stay of more than 2 midnights

## 2021-09-19 NOTE — ED PROVIDER NOTES
previous drug use. Drugs: Marijuana and Cocaine. He reports that he does not drink alcohol. Family History: family history includes Blindness in his son; Cancer in his brother and father; High Blood Pressure in his mother; Other (age of onset: 10) in his son. The patients home medications have been reviewed. Allergies: Patient has no known allergies. ----------------------------------------PHYSICAL EXAM--------------------------------------  Constitutional:  Well developed, well nourished, no acute distress, non-toxic appearance   Eyes:  PERRL, conjunctiva normal, EOMI  HENT:  Atraumatic, external ears normal, nose normal, oropharynx moist. Neck- normal range of motion, no nuchal rigidity   Respiratory:  No respiratory distress, normal breath sounds, no rales, no wheezing   Cardiovascular:  Normal rate, normal rhythm, no murmurs, no gallops, no rubs. Radial and DP pulses 2+ bilaterally. GI:  Soft, nondistended, normal bowel sounds, epigastric and RUQ tenderness, no organomegaly, no mass, no rebound, no guarding   :  No costovertebral angle tenderness   Musculoskeletal:  No edema, no tenderness, no deformities. Back- no tenderness  Integument:  Well hydrated, no visible rash. Adequate perfusion. 10 sutures in place across left knee without redness, swelling or infection. Wound healed. Lymphatic:  No cervical lymphadenopathy noted   Neurologic:  Alert & oriented x 3, CN 2-12 normal,  no focal deficits noted. Normal gait. Psychiatric:  Speech and behavior appropriate       -------------------------------------------------- RESULTS -------------------------------------------------  I have personally reviewed all laboratory and imaging results for this patient. Results are listed below.      LABS:  Results for orders placed or performed during the hospital encounter of 09/19/21   CBC auto differential   Result Value Ref Range    WBC 13.1 (H) 4.5 - 11.5 E9/L    RBC 5.35 3.80 - 5.80 E12/L Hemoglobin 16.6 (H) 12.5 - 16.5 g/dL    Hematocrit 47.3 37.0 - 54.0 %    MCV 88.4 80.0 - 99.9 fL    MCH 31.0 26.0 - 35.0 pg    MCHC 35.1 (H) 32.0 - 34.5 %    RDW 11.9 11.5 - 15.0 fL    Platelets 462 799 - 910 E9/L    MPV 11.2 7.0 - 12.0 fL    Neutrophils % 76.1 43.0 - 80.0 %    Immature Granulocytes % 0.4 0.0 - 5.0 %    Lymphocytes % 14.5 (L) 20.0 - 42.0 %    Monocytes % 5.7 2.0 - 12.0 %    Eosinophils % 2.5 0.0 - 6.0 %    Basophils % 0.8 0.0 - 2.0 %    Neutrophils Absolute 9.95 (H) 1.80 - 7.30 E9/L    Immature Granulocytes # 0.05 E9/L    Lymphocytes Absolute 1.90 1.50 - 4.00 E9/L    Monocytes Absolute 0.75 0.10 - 0.95 E9/L    Eosinophils Absolute 0.33 0.05 - 0.50 E9/L    Basophils Absolute 0.11 0.00 - 0.20 E9/L   Comprehensive Metabolic Panel   Result Value Ref Range    Sodium 140 132 - 146 mmol/L    Potassium 4.3 3.5 - 5.0 mmol/L    Chloride 101 98 - 107 mmol/L    CO2 27 22 - 29 mmol/L    Anion Gap 12 7 - 16 mmol/L    Glucose 148 (H) 74 - 99 mg/dL    BUN 12 6 - 23 mg/dL    CREATININE 1.1 0.7 - 1.2 mg/dL    GFR Non-African American >60 >=60 mL/min/1.73    GFR African American >60     Calcium 10.0 8.6 - 10.2 mg/dL    Total Protein 7.7 6.4 - 8.3 g/dL    Albumin 4.4 3.5 - 5.2 g/dL    Total Bilirubin 0.3 0.0 - 1.2 mg/dL    Alkaline Phosphatase 108 40 - 129 U/L    ALT 16 0 - 40 U/L    AST 21 0 - 39 U/L   Lipase   Result Value Ref Range    Lipase 37 13 - 60 U/L   Lactic Acid, Plasma   Result Value Ref Range    Lactic Acid 2.7 (H) 0.5 - 2.2 mmol/L   Troponin   Result Value Ref Range    Troponin, High Sensitivity 6 0 - 11 ng/L   D-Dimer, Quantitative   Result Value Ref Range    D-Dimer, Quant 397 ng/mL DDU   Brain Natriuretic Peptide   Result Value Ref Range    Pro- (H) 0 - 125 pg/mL   Magnesium   Result Value Ref Range    Magnesium 1.7 1.6 - 2.6 mg/dL   Troponin   Result Value Ref Range    Troponin, High Sensitivity 7 0 - 11 ng/L   EKG 12 Lead   Result Value Ref Range    Ventricular Rate 88 BPM    Atrial Rate 88 BPM    P-R Interval 132 ms    QRS Duration 96 ms    Q-T Interval 446 ms    QTc Calculation (Bazett) 539 ms    P Axis 29 degrees    R Axis 5 degrees    T Axis 29 degrees       RADIOLOGY:  Interpreted by Radiologist.  CT ABDOMEN PELVIS WO CONTRAST Additional Contrast? None   Final Result   1. There is a 1.6 cm stone lodged within the gallbladder neck. The   gallbladder wall is thickened and there is minimal pericholecystic fluid. Please correlate with right upper quadrant pain and physical examination. Gallbladder ultrasound is recommended. 2.  Very small hiatal hernia. CTA PULMONARY W CONTRAST   Final Result   No evidence of pulmonary embolism or acute pulmonary abnormality. XR CHEST PORTABLE   Final Result   Negative single view chest for acute process. EKG Interpretation  Time: 7:17 AM EDT  Rhythm: sinus bradycardia  Rate: 50  Axis: normal  Conduction: normal  ST Segments: no acute change  T Waves: no acute change  Clinical Impression: sinus bradycardia  Comparison to prior EKG: stable as compared to patient's most recent EKG      ------------------------- NURSING NOTES AND VITALS REVIEWED ---------------------------  The nursing notes within the ED encounter and vital signs as below have been reviewed by myself. /79   Pulse 80   Temp 96.6 °F (35.9 °C) (Temporal)   Resp 20   Ht 6' 2\" (1.88 m)   Wt 185 lb (83.9 kg)   SpO2 98%   BMI 23.75 kg/m²   Oxygen Saturation Interpretation: Normal      The patients available past medical records and past encounters were reviewed.         ------------------------------ ED COURSE/MEDICAL DECISION MAKING----------------------  Medications   0.9 % sodium chloride infusion (has no administration in time range)   fentaNYL (SUBLIMAZE) injection 50 mcg (has no administration in time range)   0.9 % sodium chloride infusion ( IntraVENous New Bag 9/19/21 1004)   aluminum & magnesium hydroxide-simethicone (MAALOX) 30 mL, lidocaine Re-evaluation. Patients symptoms show no change  Repeat physical examination is not changed      Consultations:   9:37 AM EDT  Spoke with Dr Josh Young, discussed case, accepts admission to Dr CAIN Arellano's service    Critical Care: none    Grayson CONWAY Fall, DO, am the Primary Provider of Record    Counseling: The emergency provider has spoken with the patient and spouse/SO and discussed todays results, in addition to providing specific details for the plan of care and counseling regarding the diagnosis and prognosis. Questions are answered at this time and they are agreeable with the plan.    --------------------------- IMPRESSION AND DISPOSITION ---------------------------------    IMPRESSION  1. Acute cholecystitis    2. Hiatal hernia    3.  Encounter for removal of sutures        DISPOSITION  Disposition: Transfer to [facility] to [facility]  Patient condition is stable              Carolyn Chiang, DO  09/19/21 1100 [facility], DO  09/19/21 1100 [facility],   09/19/21 1132

## 2021-09-20 ENCOUNTER — ANESTHESIA EVENT (OUTPATIENT)
Dept: OPERATING ROOM | Age: 61
DRG: 419 | End: 2021-09-20
Payer: MEDICARE

## 2021-09-20 LAB
ALBUMIN SERPL-MCNC: 3.3 G/DL (ref 3.5–5.2)
ALP BLD-CCNC: 83 U/L (ref 40–129)
ALT SERPL-CCNC: 15 U/L (ref 0–40)
ANION GAP SERPL CALCULATED.3IONS-SCNC: 6 MMOL/L (ref 7–16)
AST SERPL-CCNC: 16 U/L (ref 0–39)
BASOPHILS ABSOLUTE: 0.09 E9/L (ref 0–0.2)
BASOPHILS RELATIVE PERCENT: 1.1 % (ref 0–2)
BILIRUB SERPL-MCNC: 0.6 MG/DL (ref 0–1.2)
BUN BLDV-MCNC: 10 MG/DL (ref 6–23)
CALCIUM SERPL-MCNC: 8.8 MG/DL (ref 8.6–10.2)
CHLORIDE BLD-SCNC: 106 MMOL/L (ref 98–107)
CO2: 29 MMOL/L (ref 22–29)
CREAT SERPL-MCNC: 1.2 MG/DL (ref 0.7–1.2)
EOSINOPHILS ABSOLUTE: 0.53 E9/L (ref 0.05–0.5)
EOSINOPHILS RELATIVE PERCENT: 6.3 % (ref 0–6)
GFR AFRICAN AMERICAN: >60
GFR NON-AFRICAN AMERICAN: >60 ML/MIN/1.73
GLUCOSE BLD-MCNC: 96 MG/DL (ref 74–99)
HCT VFR BLD CALC: 38.7 % (ref 37–54)
HEMOGLOBIN: 12.9 G/DL (ref 12.5–16.5)
IMMATURE GRANULOCYTES #: 0.02 E9/L
IMMATURE GRANULOCYTES %: 0.2 % (ref 0–5)
LYMPHOCYTES ABSOLUTE: 1.99 E9/L (ref 1.5–4)
LYMPHOCYTES RELATIVE PERCENT: 23.5 % (ref 20–42)
MCH RBC QN AUTO: 30.5 PG (ref 26–35)
MCHC RBC AUTO-ENTMCNC: 33.3 % (ref 32–34.5)
MCV RBC AUTO: 91.5 FL (ref 80–99.9)
MONOCYTES ABSOLUTE: 0.8 E9/L (ref 0.1–0.95)
MONOCYTES RELATIVE PERCENT: 9.4 % (ref 2–12)
NEUTROPHILS ABSOLUTE: 5.05 E9/L (ref 1.8–7.3)
NEUTROPHILS RELATIVE PERCENT: 59.5 % (ref 43–80)
PDW BLD-RTO: 12.2 FL (ref 11.5–15)
PLATELET # BLD: 243 E9/L (ref 130–450)
PMV BLD AUTO: 11.6 FL (ref 7–12)
POTASSIUM REFLEX MAGNESIUM: 4.1 MMOL/L (ref 3.5–5)
RBC # BLD: 4.23 E12/L (ref 3.8–5.8)
SODIUM BLD-SCNC: 141 MMOL/L (ref 132–146)
TOTAL PROTEIN: 5.8 G/DL (ref 6.4–8.3)
WBC # BLD: 8.5 E9/L (ref 4.5–11.5)

## 2021-09-20 PROCEDURE — 96366 THER/PROPH/DIAG IV INF ADDON: CPT

## 2021-09-20 PROCEDURE — 6370000000 HC RX 637 (ALT 250 FOR IP): Performed by: TRANSPLANT SURGERY

## 2021-09-20 PROCEDURE — 96372 THER/PROPH/DIAG INJ SC/IM: CPT

## 2021-09-20 PROCEDURE — 6360000002 HC RX W HCPCS: Performed by: TRANSPLANT SURGERY

## 2021-09-20 PROCEDURE — 87081 CULTURE SCREEN ONLY: CPT

## 2021-09-20 PROCEDURE — 96375 TX/PRO/DX INJ NEW DRUG ADDON: CPT

## 2021-09-20 PROCEDURE — 96376 TX/PRO/DX INJ SAME DRUG ADON: CPT

## 2021-09-20 PROCEDURE — 80053 COMPREHEN METABOLIC PANEL: CPT

## 2021-09-20 PROCEDURE — 36415 COLL VENOUS BLD VENIPUNCTURE: CPT

## 2021-09-20 PROCEDURE — C9113 INJ PANTOPRAZOLE SODIUM, VIA: HCPCS | Performed by: TRANSPLANT SURGERY

## 2021-09-20 PROCEDURE — 2500000003 HC RX 250 WO HCPCS: Performed by: TRANSPLANT SURGERY

## 2021-09-20 PROCEDURE — 1200000000 HC SEMI PRIVATE

## 2021-09-20 PROCEDURE — 2580000003 HC RX 258: Performed by: TRANSPLANT SURGERY

## 2021-09-20 PROCEDURE — 85025 COMPLETE CBC W/AUTO DIFF WBC: CPT

## 2021-09-20 PROCEDURE — 99223 1ST HOSP IP/OBS HIGH 75: CPT | Performed by: SURGERY

## 2021-09-20 RX ORDER — INDOCYANINE GREEN AND WATER 25 MG
5 KIT INJECTION
Status: ACTIVE | OUTPATIENT
Start: 2021-09-20 | End: 2021-09-20

## 2021-09-20 RX ADMIN — WATER 2000 MG: 1 INJECTION INTRAMUSCULAR; INTRAVENOUS; SUBCUTANEOUS at 09:49

## 2021-09-20 RX ADMIN — PANTOPRAZOLE SODIUM 40 MG: 40 INJECTION, POWDER, FOR SOLUTION INTRAVENOUS at 09:49

## 2021-09-20 RX ADMIN — ACETAMINOPHEN 650 MG: 325 TABLET ORAL at 20:22

## 2021-09-20 RX ADMIN — METRONIDAZOLE 500 MG: 500 INJECTION, SOLUTION INTRAVENOUS at 02:20

## 2021-09-20 RX ADMIN — METRONIDAZOLE 500 MG: 500 INJECTION, SOLUTION INTRAVENOUS at 10:28

## 2021-09-20 RX ADMIN — METRONIDAZOLE 500 MG: 500 INJECTION, SOLUTION INTRAVENOUS at 18:18

## 2021-09-20 RX ADMIN — SODIUM CHLORIDE, PRESERVATIVE FREE 10 ML: 5 INJECTION INTRAVENOUS at 09:51

## 2021-09-20 RX ADMIN — ACETAMINOPHEN 650 MG: 325 TABLET ORAL at 02:20

## 2021-09-20 RX ADMIN — SODIUM CHLORIDE, PRESERVATIVE FREE 10 ML: 5 INJECTION INTRAVENOUS at 09:50

## 2021-09-20 RX ADMIN — ENOXAPARIN SODIUM 40 MG: 40 INJECTION SUBCUTANEOUS at 09:49

## 2021-09-20 ASSESSMENT — LIFESTYLE VARIABLES: SMOKING_STATUS: 1

## 2021-09-20 ASSESSMENT — PAIN SCALES - GENERAL
PAINLEVEL_OUTOF10: 0
PAINLEVEL_OUTOF10: 2
PAINLEVEL_OUTOF10: 0

## 2021-09-20 NOTE — ANESTHESIA PRE PROCEDURE
Department of Anesthesiology  Preprocedure Note       Name:  Mary Magallon   Age:  57 y.o.  :  1960                                          MRN:  [id number]         Date:  2021      Surgeon: Roro Whitley):  Brad Rm DO    Procedure: Procedure(s):  LAPAROSCOPIC ROBOTIC ASSISTED CHOLECYSTECTOMY POSSIBLE OPEN POSSIBLE GRAM    Medications prior to admission:   Prior to Admission medications    Medication Sig Start Date End Date Taking? Authorizing Provider   omeprazole (PRILOSEC) 40 MG delayed release capsule Take 1 capsule by mouth every morning (before breakfast) 21   Brad Rm DO       Current medications:    No current facility-administered medications for this visit. No current outpatient medications on file.      Facility-Administered Medications Ordered in Other Visits   Medication Dose Route Frequency Provider Last Rate Last Admin    indocyanine green (IC-GREEN) syringe 5 mg  5 mg IntraVENous On Call to Delta Germain [facility] 136, DO        sodium chloride flush 0.9 % injection 5-40 mL  5-40 mL IntraVENous 2 times per day Pretty Eric III, MD   10 mL at 21 0950    sodium chloride flush 0.9 % injection 5-40 mL  5-40 mL IntraVENous PRN Pretty Eric III, MD        0.9 % sodium chloride infusion  25 mL IntraVENous PRN Pretty Eric III, MD        ondansetron (ZOFRAN-ODT) disintegrating tablet 4 mg  4 mg Oral Q8H PRN Pretty Eric III, MD        Or    ondansetron [facility]) injection 4 mg  4 mg IntraVENous Q6H PRN Pretty Eric III, MD        enoxaparin (LOVENOX) injection 40 mg  40 mg SubCUTAneous Daily Pretty Eric III, MD   40 mg at 21 5151    lactated ringers infusion   IntraVENous Continuous Pretty Eric III,  mL/hr at 21 2300 New Bag at 21 2300    potassium chloride (KLOR-CON M) extended release tablet 40 mEq  40 mEq Oral PRN Mann Umana MD Or    potassium bicarb-citric acid (EFFER-K) effervescent tablet 40 mEq  40 mEq Oral PRN Gina Oliva III, MD        Or    potassium chloride 10 mEq/100 mL IVPB (Peripheral Line)  10 mEq IntraVENous PRN Lelandtenzin Wilkerson III, MD        magnesium sulfate 2000 mg in 50 mL IVPB premix  2,000 mg IntraVENous PRN Leland Sensor MD LESTER        cefTRIAXone (ROCEPHIN) 2,000 mg in sterile water 20 mL IV syringe  2,000 mg IntraVENous Daily Leland Sensor MD LESTER   2,000 mg at 09/20/21 0949    metronidazole (FLAGYL) 500 mg in NaCl 100 mL IVPB premix  500 mg IntraVENous Q8H Leland Sensor MD LESTER   Stopped at 09/20/21 1132    acetaminophen (TYLENOL) tablet 650 mg  650 mg Oral Q6H Leland Sensor MD LESTER   650 mg at 09/20/21 0220    HYDROmorphone (DILAUDID) injection 0.25 mg  0.25 mg IntraVENous Q3H PRN Leland Wilkerson III, MD        Or    HYDROmorphone (DILAUDID) injection 0.5 mg  0.5 mg IntraVENous Q3H PRN Leland Sensor MD LESTER        pantoprazole (PROTONIX) injection 40 mg  40 mg IntraVENous Daily Leland Wilkerson III, MD   40 mg at 09/20/21 0949    And    sodium chloride (PF) 0.9 % injection 10 mL  10 mL IntraVENous Daily Leland Sensor MD LESTER   10 mL at 09/20/21 1987       Allergies:  No Known Allergies    Problem List:    Patient Active Problem List   Diagnosis Code    Sub. agg. of pre-existing disc herniation L3-4 M51.26    Sub. agg.  of pre-existing disc herniation L4-5 M51.26    GERD (gastroesophageal reflux disease) K21.9    Left shoulder pain M25.512    Acute prostatitis N41.0    Elevated serum creatinine R79.89    Epigastric pain R10.13    Choledochitis K83.09    Choledocholithiasis K80.50    Acute cholecystitis due to biliary calculus K80.00    Acute cholecystitis K81.0       Past Medical History:        Diagnosis Date    Chronic back pain     GERD (gastroesophageal reflux disease) 10/10/2014    Herniated nucleus pulposus, L2-3 left     Herniated nucleus pulposus, L4-5     Inguinal hernia 8-14-15    Right, for repair/revision    Left shoulder pain 10/10/2014    Since 9/2014    Sprain and strain of lumbosacral joint/ligament        Past Surgical History:        Procedure Laterality Date    BACK SURGERY  7/28/03    LLAM L3-4 left by Dr. Olivia Butts COLONOSCOPY  2014     normal    FOOT SURGERY Left 1980's    to remove pinched nerve    HERNIA REPAIR Right 11/18/2014    inguinal- laproscopic    INGUINAL HERNIA REPAIR  8/`14/15    right     MOUTH SURGERY      multiple surgeries    OTHER SURGICAL HISTORY Left 1990's    thumb to repair traumatic injury    OTHER SURGICAL HISTORY Right 11/185/2014    laparoscopic right inguinal hernia repair    TONSILLECTOMY      UPPER GASTROINTESTINAL ENDOSCOPY  2014    normal    UPPER GASTROINTESTINAL ENDOSCOPY N/A 9/3/2020    EGD BIOPSY [facility] Jaye Morelos DO at [facility] ENDOSCOPY       Social History:    Social History     Tobacco Use    Smoking status: Current Every Day Smoker     Packs/day: 0.50     Years: 30.00     Pack years: 15.00    Smokeless tobacco: Never Used    Tobacco comment: Trying to quit   Substance Use Topics    Alcohol use: No     Alcohol/week: 0.0 standard drinks     Comment: socially                                Ready to quit: Not Answered  Counseling given: Not Answered  Comment: Trying to quit      Vital Signs (Current): There were no vitals filed for this visit.                                            BP Readings from Last 3 Encounters:   09/20/21 134/82   09/19/21 120/80   09/07/21 130/88       NPO Status:                                                                                 BMI:   Wt Readings from Last 3 Encounters:   09/20/21 183 lb (83 kg)   09/19/21 185 lb (83.9 kg)   09/07/21 185 lb (83.9 kg)     There is no height or weight on file to calculate BMI.    CBC:   Lab Results   Component Value Date    WBC 8.5 09/20/2021    RBC 4.23 09/20/2021    HGB 12.9 09/20/2021    HCT 38.7 09/20/2021    MCV 91.5 09/20/2021    RDW 12.2 09/20/2021     09/20/2021       CMP:   Lab Results   Component Value Date     09/20/2021    K 4.1 09/20/2021     09/20/2021    CO2 29 09/20/2021    BUN 10 09/20/2021    CREATININE 1.2 09/20/2021    GFRAA >60 09/20/2021    LABGLOM >60 09/20/2021    GLUCOSE 96 09/20/2021    PROT 5.8 09/20/2021    CALCIUM 8.8 09/20/2021    BILITOT 0.6 09/20/2021    ALKPHOS 83 09/20/2021    AST 16 09/20/2021    ALT 15 09/20/2021       POC Tests: No results for input(s): POCGLU, POCNA, POCK, POCCL, POCBUN, POCHEMO, POCHCT in the last 72 hours. Coags:   Lab Results   Component Value Date    PROTIME 12.4 08/05/2020    INR 1.1 08/05/2020    APTT 30.3 08/05/2020       HCG (If Applicable): No results found for: PREGTESTUR, PREGSERUM, HCG, HCGQUANT     ABGs: No results found for: PHART, PO2ART, PUA8NGN, HUL3KAS, BEART, Z4SIACXK     Type & Screen (If Applicable):  No results found for: [facility]    Drug/Infectious Status (If Applicable):  Lab Results   Component Value Date    HEPCAB NON REACT 10/21/2013       COVID-19 Screening (If Applicable):   Lab Results   Component Value Date    COVID19 Not Detected 08/28/2020         Anesthesia Evaluation  Patient summary reviewed no history of anesthetic complications:   Airway: Mallampati: II  TM distance: >3 FB   Neck ROM: full  Mouth opening: > = 3 FB Dental:    (+) upper dentures, lower dentures and partials      Pulmonary: breath sounds clear to auscultation  (+) current smoker          Patient did not smoke on day of surgery.                  Cardiovascular:Negative CV ROS  Exercise tolerance: good (>4 METS),           Rhythm: regular  Rate: normal           Beta Blocker:  Not on Beta Blocker         Neuro/Psych:   (+) neuromuscular disease (chronic back pain):,             GI/Hepatic/Renal:   (+) GERD: well controlled,           Endo/Other: Negative Endo/Other ROS                    Abdominal:             Vascular: negative vascular ROS. Other Findings:               Anesthesia Plan      general     ASA 2       Induction: intravenous. MIPS: Postoperative opioids intended and Prophylactic antiemetics administered. Anesthetic plan and risks discussed with patient. Plan discussed with CRNA and surgical team.    Attending anesthesiologist reviewed and agrees with Preprocedure content            Madelyn Krabbe, MD   9/20/2021      Patient will need to be re-evaluated prior to surgery by DOS anesthesiologist.    Madelyn Krabbe, MD           9/20/2021        5:18 PM        DOS STAFF ADDENDUM:    Pt seen and examined, physical exam updated, chart reviewed including anesthesia, drug and allergy history. H&P reviewed. No interval changes to history or physical examination (unless noted above). NPO status confirmed. Anesthetic plan, risks, benefits, alternatives discussed with patient. Patient verbalized an understanding and agrees to proceed.      Larry Garcia MD   Anesthesiologist

## 2021-09-20 NOTE — PATIENT CARE CONFERENCE
[facility] Quality Flow/Interdisciplinary Rounds Progress Note        Quality Flow Rounds held on September 20, 2021    Disciplines Attending:  Bedside Nurse, ,  and Nursing Unit [address] Lizy Machado was admitted on 9/19/2021 12:40 PM    Anticipated Discharge Date:  Expected Discharge Date: 09/21/21    Disposition:    Teodoro Score:  Teodoro Scale Score: 21    Readmission Risk              Risk of Unplanned Readmission:  10           Discussed patient goal for the day, patient clinical progression, and barriers to discharge.   The following Goal(s) of the Day/Commitment(s) have been identified:  Labs - Report Results      Bryce Hanna RN  September 20, 2021

## 2021-09-20 NOTE — H&P
GENERAL SURGERY  HISTORY AND PHYSICAL  9/20/2021    No chief complaint on file. DELROY Mccoy Sr. is a 57 y.o. male who presents for evaluation of epigastric and chest pain which began on 9/18. This pain began before going to bed and was associated with nausea. He denies  vomiting, palpitations, flushing, or weight change. He was previously diagnosed with Peña's esophagus on 9/28/2020 by EGD with Dr. Nesha Khan. He has since been taking Protonix which has resulted in the resolution of his GERD symptoms. Additional past surgical history is significant for 2 repairs of recurrent right inguinal hernia by Dr. Erik Prakash in 2015. He does not take anticoagulant or antiplatelet medications. Past Medical History:   Diagnosis Date    Chronic back pain     GERD (gastroesophageal reflux disease) 10/10/2014    Herniated nucleus pulposus, L2-3 left     Herniated nucleus pulposus, L4-5     Inguinal hernia 8-14-15    Right, for repair/revision    Left shoulder pain 10/10/2014    Since 9/2014    Sprain and strain of lumbosacral joint/ligament        Past Surgical History:   Procedure Laterality Date    BACK SURGERY  7/28/03    LLAM L3-4 left by Dr. Candis Corral COLONOSCOPY  2014     normal    FOOT SURGERY Left 1980's    to remove pinched nerve    HERNIA REPAIR Right 11/18/2014    inguinal- laproscopic    INGUINAL HERNIA REPAIR  8/`14/15    right     MOUTH SURGERY      multiple surgeries    OTHER SURGICAL HISTORY Left 1990's    thumb to repair traumatic injury    OTHER SURGICAL HISTORY Right 11/185/2014    laparoscopic right inguinal hernia repair    TONSILLECTOMY      UPPER GASTROINTESTINAL ENDOSCOPY  2014    normal    UPPER GASTROINTESTINAL ENDOSCOPY N/A 9/3/2020    EGD BIOPSY performed by Villa Nevarez DO at [address]       Prior to Admission medications    Medication Sig Start Date End Date Taking?  Authorizing Provider   omeprazole (PRILOSEC) 40 MG delayed release capsule Take 1 capsule by mouth every morning (before breakfast) 8/5/21   Fabiola [place], DO       No Known Allergies    Family History   Problem Relation Age of Onset    Cancer Father         unknown    Cancer Brother         bladder    Blindness Son         Left eye    Other Son 6        Turret's (movement)    High Blood Pressure Mother        Social History     Tobacco Use    Smoking status: Current Every Day Smoker     Packs/day: 0.50     Years: 30.00     Pack years: 15.00    Smokeless tobacco: Never Used    Tobacco comment: Trying to quit   Vaping Use    Vaping Use: Never used   Substance Use Topics    Alcohol use: No     Alcohol/week: 0.0 standard drinks     Comment: socially    Drug use: Not Currently     Types: Marijuana, Cocaine     Comment: last used of cocaine was 2012, last use marijuana 1999         Review of Systems   Negative except as listed in the HPI       PHYSICAL EXAM:    Vitals:    09/20/21 0335   BP: 122/72   Pulse: 73   Resp: 16   Temp: 98.5 °F (36.9 °C)   SpO2:        General Appearance:  awake, alert, oriented, in no acute distress  Skin:  Skin color, texture, turgor normal. No rashes or lesions. Head/face:  NCAT  Eyes:  No gross abnormalities. , PERRL, EOMI and Sclera nonicteric  Lungs/Chest:  Normal expansion. No chest wall tenderness  Heart:  Heart regular rate. No chest pain  Abdomen:  Soft, non tender, mildly distended. Negative Caruso sign No palpable organomegaly or masses. Extremities: Extremities warm to touch, pink, with no edema. Rectal:  Rectal exam deferred.     LABS:  CBC  Recent Labs     09/20/21  0338   WBC 8.5   HGB 12.9   HCT 38.7        BMP  Recent Labs     09/20/21  0338      K 4.1      CO2 29   BUN 10   CREATININE 1.2   CALCIUM 8.8     Liver Function  Recent Labs     09/19/21  0650 09/19/21  0650 09/20/21  0338   LIPASE 37  --   --    BILITOT 0.3   < > 0.6   AST 21   < > 16   ALT 16   < > 15   ALKPHOS 108   < > 83   PROT 7.7   < > 5.8*   LABALBU 4.4   < > 3.3*    < > = values in this interval not displayed. No results for input(s): LACTATE in the last 72 hours. No results for input(s): INR, PTT in the last 72 hours. Invalid input(s): PT    RADIOLOGY    CT ABDOMEN PELVIS WO CONTRAST Additional Contrast? None    Result Date: 9/19/2021  EXAMINATION: CT OF THE ABDOMEN AND PELVIS WITHOUT CONTRAST 9/19/2021 8:14 am TECHNIQUE: CT of the abdomen and pelvis was performed without the administration of intravenous contrast. Multiplanar reformatted images are provided for review. Dose modulation, iterative reconstruction, and/or weight based adjustment of the mA/kV was utilized to reduce the radiation dose to as low as reasonably achievable. COMPARISON: None. HISTORY: ORDERING SYSTEM PROVIDED HISTORY: epigastric pain TECHNOLOGIST PROVIDED HISTORY: Reason for exam:->epigastric pain Additional Contrast?->None Decision Support Exception - unselect if not a suspected or confirmed emergency medical condition->Emergency Medical Condition (MA) FINDINGS: Lower Chest:  Visualized portion of the lower chest demonstrates no acute abnormality. Organs: The liver, spleen, pancreas and adrenal glands are unremarkable. There is no right or left obstructive uropathy. There are no findings of pyelonephritis. Note is made of a 7 mm cyst seen within the left kidney. There is no calculus seen within the course of the ureters. There is contrast within the urinary bladder which is not distended. GI/Bowel: There is a calcified stones seen within the gallbladder neck measuring 1.6 cm. The gallbladder wall is thickened and there is minimal pericholecystic fluid adjacent to the gallbladder neck. The common bile duct is not dilated. There is no evidence of choledocholithiasis. The stomach is normally distended. There is a very small hiatal hernia. There is no large or small bowel obstruction. Pelvis:  Bladder is unremarkable in appearance. There is no pelvic inflammatory process.   The appendix is visualized and is unremarkable. No evidence of acute appendicitis. Peritoneum/Retroperitoneum:  No evidence of retroperitoneal lymphadenopathy. Bones/Soft Tissues:  No acute abnormality of the visualized osseous structures. 1.  There is a 1.6 cm stone lodged within the gallbladder neck. The gallbladder wall is thickened and there is minimal pericholecystic fluid. Please correlate with right upper quadrant pain and physical examination. Gallbladder ultrasound is recommended. 2.  Very small hiatal hernia. XR CHEST PORTABLE    Result Date: 9/19/2021  EXAMINATION: ONE XRAY VIEW OF THE CHEST 9/19/2021 6:52 am COMPARISON: 12/17/2015 frontal radiograph associated with rib series. HISTORY: ORDERING SYSTEM PROVIDED HISTORY: pain TECHNOLOGIST PROVIDED HISTORY: Reason for exam:->pain FINDINGS: Stable normal cardiomediastinal silhouette and pulmonary vasculature. No pneumothorax, pleural effusion, overt pulmonary edema or evidence of pneumonia. No acute osseous abnormality. Negative single view chest for acute process. CTA PULMONARY W CONTRAST    Result Date: 9/19/2021  EXAMINATION: CTA OF THE CHEST 9/19/2021 7:32 am TECHNIQUE: CTA of the chest was performed after the administration of intravenous contrast.  Multiplanar reformatted images are provided for review. MIP images are provided for review. Dose modulation, iterative reconstruction, and/or weight based adjustment of the mA/kV was utilized to reduce the radiation dose to as low as reasonably achievable. COMPARISON: None. HISTORY: ORDERING SYSTEM PROVIDED HISTORY: rule out PE TECHNOLOGIST PROVIDED HISTORY: Reason for exam:->rule out PE Decision Support Exception - unselect if not a suspected or confirmed emergency medical condition->Emergency Medical Condition (MA) FINDINGS: Pulmonary Arteries: Pulmonary arteries are adequately opacified for evaluation. No evidence of intraluminal filling defect to suggest pulmonary embolism.   Main pulmonary artery is normal in caliber. Mediastinum: Ascending aorta is around upper limits of normal size Lungs/pleura: Minimal basilar atelectasis and apical scarring and emphysema Upper Abdomen: Cholelithiasis a Soft Tissues/Bones: No acute bone or soft tissue abnormality. No evidence of pulmonary embolism or acute pulmonary abnormality. ASSESSMENT:  57 y.o. male with symptomatic cholelithiasis. History of GERD treated with Protonix.     PLAN:  - HIDA  - laparoscopic, robotic assisted, cholecystectomy 9/21  - NPO at midnight  - pain/nausea control  - IVF    Plan discussed with Dr. Andrew George    Electronically signed by Yessica Mckeon DO on 9/20/21 at 7:04 AM EDT

## 2021-09-21 ENCOUNTER — ANESTHESIA (OUTPATIENT)
Dept: OPERATING ROOM | Age: 61
DRG: 419 | End: 2021-09-21
Payer: MEDICARE

## 2021-09-21 ENCOUNTER — APPOINTMENT (OUTPATIENT)
Dept: GENERAL RADIOLOGY | Age: 61
DRG: 419 | End: 2021-09-21
Attending: SURGERY
Payer: MEDICARE

## 2021-09-21 VITALS
OXYGEN SATURATION: 94 % | SYSTOLIC BLOOD PRESSURE: 149 MMHG | DIASTOLIC BLOOD PRESSURE: 93 MMHG | RESPIRATION RATE: 16 BRPM | TEMPERATURE: 97.5 F

## 2021-09-21 LAB
ALBUMIN SERPL-MCNC: 3.5 G/DL (ref 3.5–5.2)
ALP BLD-CCNC: 85 U/L (ref 40–129)
ALT SERPL-CCNC: 16 U/L (ref 0–40)
ANION GAP SERPL CALCULATED.3IONS-SCNC: 8 MMOL/L (ref 7–16)
AST SERPL-CCNC: 18 U/L (ref 0–39)
BASOPHILS ABSOLUTE: 0.07 E9/L (ref 0–0.2)
BASOPHILS RELATIVE PERCENT: 0.7 % (ref 0–2)
BILIRUB SERPL-MCNC: 0.7 MG/DL (ref 0–1.2)
BUN BLDV-MCNC: 8 MG/DL (ref 6–23)
CALCIUM SERPL-MCNC: 9.1 MG/DL (ref 8.6–10.2)
CHLORIDE BLD-SCNC: 106 MMOL/L (ref 98–107)
CO2: 28 MMOL/L (ref 22–29)
CREAT SERPL-MCNC: 1.1 MG/DL (ref 0.7–1.2)
EOSINOPHILS ABSOLUTE: 0.64 E9/L (ref 0.05–0.5)
EOSINOPHILS RELATIVE PERCENT: 6.5 % (ref 0–6)
GFR AFRICAN AMERICAN: >60
GFR NON-AFRICAN AMERICAN: >60 ML/MIN/1.73
GLUCOSE BLD-MCNC: 95 MG/DL (ref 74–99)
HCT VFR BLD CALC: 39.4 % (ref 37–54)
HEMOGLOBIN: 13.3 G/DL (ref 12.5–16.5)
IMMATURE GRANULOCYTES #: 0.03 E9/L
IMMATURE GRANULOCYTES %: 0.3 % (ref 0–5)
LYMPHOCYTES ABSOLUTE: 2.12 E9/L (ref 1.5–4)
LYMPHOCYTES RELATIVE PERCENT: 21.4 % (ref 20–42)
MCH RBC QN AUTO: 30.9 PG (ref 26–35)
MCHC RBC AUTO-ENTMCNC: 33.8 % (ref 32–34.5)
MCV RBC AUTO: 91.6 FL (ref 80–99.9)
MONOCYTES ABSOLUTE: 0.94 E9/L (ref 0.1–0.95)
MONOCYTES RELATIVE PERCENT: 9.5 % (ref 2–12)
NEUTROPHILS ABSOLUTE: 6.12 E9/L (ref 1.8–7.3)
NEUTROPHILS RELATIVE PERCENT: 61.6 % (ref 43–80)
PDW BLD-RTO: 12.1 FL (ref 11.5–15)
PLATELET # BLD: 250 E9/L (ref 130–450)
PMV BLD AUTO: 11.8 FL (ref 7–12)
POTASSIUM REFLEX MAGNESIUM: 3.7 MMOL/L (ref 3.5–5)
RBC # BLD: 4.3 E12/L (ref 3.8–5.8)
SODIUM BLD-SCNC: 142 MMOL/L (ref 132–146)
TOTAL PROTEIN: 6.2 G/DL (ref 6.4–8.3)
WBC # BLD: 9.9 E9/L (ref 4.5–11.5)

## 2021-09-21 PROCEDURE — 2500000003 HC RX 250 WO HCPCS: Performed by: TRANSPLANT SURGERY

## 2021-09-21 PROCEDURE — 36415 COLL VENOUS BLD VENIPUNCTURE: CPT

## 2021-09-21 PROCEDURE — 2500000003 HC RX 250 WO HCPCS: Performed by: SURGERY

## 2021-09-21 PROCEDURE — 2580000003 HC RX 258: Performed by: NURSE ANESTHETIST, CERTIFIED REGISTERED

## 2021-09-21 PROCEDURE — 6360000002 HC RX W HCPCS: Performed by: ANESTHESIOLOGY

## 2021-09-21 PROCEDURE — 2580000003 HC RX 258: Performed by: SURGERY

## 2021-09-21 PROCEDURE — 1200000000 HC SEMI PRIVATE

## 2021-09-21 PROCEDURE — 80053 COMPREHEN METABOLIC PANEL: CPT

## 2021-09-21 PROCEDURE — 3600000009 HC SURGERY ROBOT BASE: Performed by: SURGERY

## 2021-09-21 PROCEDURE — 3600000019 HC SURGERY ROBOT ADDTL 15MIN: Performed by: SURGERY

## 2021-09-21 PROCEDURE — 6360000002 HC RX W HCPCS: Performed by: TRANSPLANT SURGERY

## 2021-09-21 PROCEDURE — 2580000003 HC RX 258: Performed by: TRANSPLANT SURGERY

## 2021-09-21 PROCEDURE — 96366 THER/PROPH/DIAG IV INF ADDON: CPT

## 2021-09-21 PROCEDURE — 88304 TISSUE EXAM BY PATHOLOGIST: CPT

## 2021-09-21 PROCEDURE — 6360000002 HC RX W HCPCS: Performed by: SURGERY

## 2021-09-21 PROCEDURE — C9113 INJ PANTOPRAZOLE SODIUM, VIA: HCPCS | Performed by: TRANSPLANT SURGERY

## 2021-09-21 PROCEDURE — 8E0W4CZ ROBOTIC ASSISTED PROCEDURE OF TRUNK REGION, PERCUTANEOUS ENDOSCOPIC APPROACH: ICD-10-PCS | Performed by: SURGERY

## 2021-09-21 PROCEDURE — 3700000001 HC ADD 15 MINUTES (ANESTHESIA): Performed by: SURGERY

## 2021-09-21 PROCEDURE — 7100000001 HC PACU RECOVERY - ADDTL 15 MIN: Performed by: SURGERY

## 2021-09-21 PROCEDURE — 96375 TX/PRO/DX INJ NEW DRUG ADDON: CPT

## 2021-09-21 PROCEDURE — 2709999900 HC NON-CHARGEABLE SUPPLY: Performed by: SURGERY

## 2021-09-21 PROCEDURE — 47562 LAPAROSCOPIC CHOLECYSTECTOMY: CPT | Performed by: SURGERY

## 2021-09-21 PROCEDURE — 6360000002 HC RX W HCPCS: Performed by: NURSE ANESTHETIST, CERTIFIED REGISTERED

## 2021-09-21 PROCEDURE — 2500000003 HC RX 250 WO HCPCS: Performed by: NURSE ANESTHETIST, CERTIFIED REGISTERED

## 2021-09-21 PROCEDURE — 3700000000 HC ANESTHESIA ATTENDED CARE: Performed by: SURGERY

## 2021-09-21 PROCEDURE — 7100000000 HC PACU RECOVERY - FIRST 15 MIN: Performed by: SURGERY

## 2021-09-21 PROCEDURE — 85025 COMPLETE CBC W/AUTO DIFF WBC: CPT

## 2021-09-21 PROCEDURE — 96376 TX/PRO/DX INJ SAME DRUG ADON: CPT

## 2021-09-21 PROCEDURE — S2900 ROBOTIC SURGICAL SYSTEM: HCPCS | Performed by: SURGERY

## 2021-09-21 PROCEDURE — 0FT44ZZ RESECTION OF GALLBLADDER, PERCUTANEOUS ENDOSCOPIC APPROACH: ICD-10-PCS | Performed by: SURGERY

## 2021-09-21 RX ORDER — INDOCYANINE GREEN AND WATER 25 MG
5 KIT INJECTION
Status: COMPLETED | OUTPATIENT
Start: 2021-09-21 | End: 2021-09-21

## 2021-09-21 RX ORDER — PROMETHAZINE HYDROCHLORIDE 25 MG/ML
6.25 INJECTION, SOLUTION INTRAMUSCULAR; INTRAVENOUS
Status: DISCONTINUED | OUTPATIENT
Start: 2021-09-21 | End: 2021-09-21 | Stop reason: HOSPADM

## 2021-09-21 RX ORDER — SODIUM CHLORIDE 9 MG/ML
INJECTION, SOLUTION INTRAVENOUS CONTINUOUS PRN
Status: DISCONTINUED | OUTPATIENT
Start: 2021-09-21 | End: 2021-09-21 | Stop reason: SDUPTHER

## 2021-09-21 RX ORDER — GLYCOPYRROLATE 1 MG/5 ML
SYRINGE (ML) INTRAVENOUS PRN
Status: DISCONTINUED | OUTPATIENT
Start: 2021-09-21 | End: 2021-09-21 | Stop reason: SDUPTHER

## 2021-09-21 RX ORDER — LIDOCAINE HYDROCHLORIDE 20 MG/ML
INJECTION, SOLUTION EPIDURAL; INFILTRATION; INTRACAUDAL; PERINEURAL PRN
Status: DISCONTINUED | OUTPATIENT
Start: 2021-09-21 | End: 2021-09-21 | Stop reason: SDUPTHER

## 2021-09-21 RX ORDER — SODIUM CHLORIDE, SODIUM LACTATE, POTASSIUM CHLORIDE, CALCIUM CHLORIDE 600; 310; 30; 20 MG/100ML; MG/100ML; MG/100ML; MG/100ML
INJECTION, SOLUTION INTRAVENOUS CONTINUOUS
Status: DISCONTINUED | OUTPATIENT
Start: 2021-09-21 | End: 2021-09-22 | Stop reason: HOSPADM

## 2021-09-21 RX ORDER — LABETALOL HYDROCHLORIDE 5 MG/ML
INJECTION, SOLUTION INTRAVENOUS PRN
Status: DISCONTINUED | OUTPATIENT
Start: 2021-09-21 | End: 2021-09-21 | Stop reason: SDUPTHER

## 2021-09-21 RX ORDER — ONDANSETRON 2 MG/ML
INJECTION INTRAMUSCULAR; INTRAVENOUS PRN
Status: DISCONTINUED | OUTPATIENT
Start: 2021-09-21 | End: 2021-09-21 | Stop reason: SDUPTHER

## 2021-09-21 RX ORDER — BUPIVACAINE HYDROCHLORIDE AND EPINEPHRINE 2.5; 5 MG/ML; UG/ML
INJECTION, SOLUTION EPIDURAL; INFILTRATION; INTRACAUDAL; PERINEURAL PRN
Status: DISCONTINUED | OUTPATIENT
Start: 2021-09-21 | End: 2021-09-21 | Stop reason: ALTCHOICE

## 2021-09-21 RX ORDER — PROPOFOL 10 MG/ML
INJECTION, EMULSION INTRAVENOUS PRN
Status: DISCONTINUED | OUTPATIENT
Start: 2021-09-21 | End: 2021-09-21 | Stop reason: SDUPTHER

## 2021-09-21 RX ORDER — MIDAZOLAM HYDROCHLORIDE 1 MG/ML
INJECTION INTRAMUSCULAR; INTRAVENOUS PRN
Status: DISCONTINUED | OUTPATIENT
Start: 2021-09-21 | End: 2021-09-21 | Stop reason: SDUPTHER

## 2021-09-21 RX ORDER — OXYCODONE HYDROCHLORIDE 5 MG/1
10 TABLET ORAL EVERY 4 HOURS PRN
Status: DISCONTINUED | OUTPATIENT
Start: 2021-09-21 | End: 2021-09-22 | Stop reason: HOSPADM

## 2021-09-21 RX ORDER — ROCURONIUM BROMIDE 10 MG/ML
INJECTION, SOLUTION INTRAVENOUS PRN
Status: DISCONTINUED | OUTPATIENT
Start: 2021-09-21 | End: 2021-09-21 | Stop reason: SDUPTHER

## 2021-09-21 RX ORDER — FENTANYL CITRATE 50 UG/ML
INJECTION, SOLUTION INTRAMUSCULAR; INTRAVENOUS PRN
Status: DISCONTINUED | OUTPATIENT
Start: 2021-09-21 | End: 2021-09-21 | Stop reason: SDUPTHER

## 2021-09-21 RX ORDER — OXYCODONE HYDROCHLORIDE 5 MG/1
5 TABLET ORAL EVERY 4 HOURS PRN
Status: DISCONTINUED | OUTPATIENT
Start: 2021-09-21 | End: 2021-09-22 | Stop reason: HOSPADM

## 2021-09-21 RX ORDER — NEOSTIGMINE METHYLSULFATE 1 MG/ML
INJECTION, SOLUTION INTRAVENOUS PRN
Status: DISCONTINUED | OUTPATIENT
Start: 2021-09-21 | End: 2021-09-21 | Stop reason: SDUPTHER

## 2021-09-21 RX ORDER — OXYCODONE HYDROCHLORIDE 5 MG/1
5 TABLET ORAL EVERY 6 HOURS PRN
Qty: 12 TABLET | Refills: 0 | Status: SHIPPED | OUTPATIENT
Start: 2021-09-21 | End: 2021-09-24

## 2021-09-21 RX ORDER — DEXAMETHASONE SODIUM PHOSPHATE 4 MG/ML
INJECTION, SOLUTION INTRA-ARTICULAR; INTRALESIONAL; INTRAMUSCULAR; INTRAVENOUS; SOFT TISSUE PRN
Status: DISCONTINUED | OUTPATIENT
Start: 2021-09-21 | End: 2021-09-21 | Stop reason: SDUPTHER

## 2021-09-21 RX ADMIN — HYDROMORPHONE HYDROCHLORIDE 0.5 MG: 1 INJECTION, SOLUTION INTRAMUSCULAR; INTRAVENOUS; SUBCUTANEOUS at 18:00

## 2021-09-21 RX ADMIN — METRONIDAZOLE 500 MG: 500 INJECTION, SOLUTION INTRAVENOUS at 09:15

## 2021-09-21 RX ADMIN — INDOCYANINE GREEN AND WATER 5 MG: KIT at 12:30

## 2021-09-21 RX ADMIN — SODIUM CHLORIDE, POTASSIUM CHLORIDE, SODIUM LACTATE AND CALCIUM CHLORIDE: 600; 310; 30; 20 INJECTION, SOLUTION INTRAVENOUS at 03:42

## 2021-09-21 RX ADMIN — Medication 0.6 MG: at 15:43

## 2021-09-21 RX ADMIN — DEXAMETHASONE SODIUM PHOSPHATE 8 MG: 4 INJECTION, SOLUTION INTRAMUSCULAR; INTRAVENOUS at 14:33

## 2021-09-21 RX ADMIN — SODIUM CHLORIDE, PRESERVATIVE FREE 10 ML: 5 INJECTION INTRAVENOUS at 09:14

## 2021-09-21 RX ADMIN — LIDOCAINE HYDROCHLORIDE 100 MG: 20 INJECTION, SOLUTION EPIDURAL; INFILTRATION; INTRACAUDAL; PERINEURAL at 14:33

## 2021-09-21 RX ADMIN — FENTANYL CITRATE 150 MCG: 50 INJECTION, SOLUTION INTRAMUSCULAR; INTRAVENOUS at 14:33

## 2021-09-21 RX ADMIN — METRONIDAZOLE 500 MG: 500 INJECTION, SOLUTION INTRAVENOUS at 18:10

## 2021-09-21 RX ADMIN — ONDANSETRON 4 MG: 2 INJECTION INTRAMUSCULAR; INTRAVENOUS at 14:47

## 2021-09-21 RX ADMIN — MIDAZOLAM 2 MG: 1 INJECTION INTRAMUSCULAR; INTRAVENOUS at 14:23

## 2021-09-21 RX ADMIN — ROCURONIUM BROMIDE 50 MG: 10 INJECTION, SOLUTION INTRAVENOUS at 14:33

## 2021-09-21 RX ADMIN — FENTANYL CITRATE 50 MCG: 50 INJECTION, SOLUTION INTRAMUSCULAR; INTRAVENOUS at 14:46

## 2021-09-21 RX ADMIN — WATER 2000 MG: 1 INJECTION INTRAMUSCULAR; INTRAVENOUS; SUBCUTANEOUS at 09:31

## 2021-09-21 RX ADMIN — SODIUM CHLORIDE, POTASSIUM CHLORIDE, SODIUM LACTATE AND CALCIUM CHLORIDE: 600; 310; 30; 20 INJECTION, SOLUTION INTRAVENOUS at 19:03

## 2021-09-21 RX ADMIN — SODIUM CHLORIDE: 9 INJECTION, SOLUTION INTRAVENOUS at 15:05

## 2021-09-21 RX ADMIN — PANTOPRAZOLE SODIUM 40 MG: 40 INJECTION, POWDER, FOR SOLUTION INTRAVENOUS at 09:14

## 2021-09-21 RX ADMIN — SODIUM CHLORIDE: 9 INJECTION, SOLUTION INTRAVENOUS at 14:23

## 2021-09-21 RX ADMIN — LABETALOL HYDROCHLORIDE 10 MG: 5 INJECTION INTRAVENOUS at 14:55

## 2021-09-21 RX ADMIN — LABETALOL HYDROCHLORIDE 10 MG: 5 INJECTION INTRAVENOUS at 14:50

## 2021-09-21 RX ADMIN — HYDROMORPHONE HYDROCHLORIDE 0.5 MG: 1 INJECTION, SOLUTION INTRAMUSCULAR; INTRAVENOUS; SUBCUTANEOUS at 21:44

## 2021-09-21 RX ADMIN — PROPOFOL 200 MG: 10 INJECTION, EMULSION INTRAVENOUS at 14:33

## 2021-09-21 RX ADMIN — Medication 3 MG: at 15:43

## 2021-09-21 RX ADMIN — SODIUM CHLORIDE, PRESERVATIVE FREE 10 ML: 5 INJECTION INTRAVENOUS at 09:21

## 2021-09-21 RX ADMIN — HYDROMORPHONE HYDROCHLORIDE 0.5 MG: 1 INJECTION, SOLUTION INTRAMUSCULAR; INTRAVENOUS; SUBCUTANEOUS at 16:50

## 2021-09-21 RX ADMIN — METRONIDAZOLE 500 MG: 500 INJECTION, SOLUTION INTRAVENOUS at 02:37

## 2021-09-21 ASSESSMENT — PULMONARY FUNCTION TESTS
PIF_VALUE: 20
PIF_VALUE: 14
PIF_VALUE: 19
PIF_VALUE: 2
PIF_VALUE: 20
PIF_VALUE: 3
PIF_VALUE: 14
PIF_VALUE: 20
PIF_VALUE: 1
PIF_VALUE: 15
PIF_VALUE: 23
PIF_VALUE: 19
PIF_VALUE: 20
PIF_VALUE: 3
PIF_VALUE: 14
PIF_VALUE: 18
PIF_VALUE: 1
PIF_VALUE: 3
PIF_VALUE: 2
PIF_VALUE: 15
PIF_VALUE: 22
PIF_VALUE: 20
PIF_VALUE: 19
PIF_VALUE: 20
PIF_VALUE: 1
PIF_VALUE: 20
PIF_VALUE: 14
PIF_VALUE: 20
PIF_VALUE: 1
PIF_VALUE: 20
PIF_VALUE: 4
PIF_VALUE: 21
PIF_VALUE: 17
PIF_VALUE: 20
PIF_VALUE: 14
PIF_VALUE: 1
PIF_VALUE: 24
PIF_VALUE: 14
PIF_VALUE: 23
PIF_VALUE: 2
PIF_VALUE: 20
PIF_VALUE: 20
PIF_VALUE: 23
PIF_VALUE: 19
PIF_VALUE: 19
PIF_VALUE: 14
PIF_VALUE: 22
PIF_VALUE: 20
PIF_VALUE: 18
PIF_VALUE: 3
PIF_VALUE: 32
PIF_VALUE: 20
PIF_VALUE: 0
PIF_VALUE: 18
PIF_VALUE: 20
PIF_VALUE: 23
PIF_VALUE: 14
PIF_VALUE: 13
PIF_VALUE: 20
PIF_VALUE: 4
PIF_VALUE: 3
PIF_VALUE: 16
PIF_VALUE: 20
PIF_VALUE: 0
PIF_VALUE: 22
PIF_VALUE: 23
PIF_VALUE: 24
PIF_VALUE: 20
PIF_VALUE: 24
PIF_VALUE: 1
PIF_VALUE: 20
PIF_VALUE: 20
PIF_VALUE: 3
PIF_VALUE: 20
PIF_VALUE: 23
PIF_VALUE: 4
PIF_VALUE: 3
PIF_VALUE: 23
PIF_VALUE: 20
PIF_VALUE: 18
PIF_VALUE: 3
PIF_VALUE: 18
PIF_VALUE: 21
PIF_VALUE: 4

## 2021-09-21 ASSESSMENT — PAIN SCALES - GENERAL
PAINLEVEL_OUTOF10: 7
PAINLEVEL_OUTOF10: 10
PAINLEVEL_OUTOF10: 0
PAINLEVEL_OUTOF10: 8
PAINLEVEL_OUTOF10: 0

## 2021-09-21 NOTE — PATIENT CARE CONFERENCE
[facility] Quality Flow/Interdisciplinary Rounds Progress Note        Quality Flow Rounds held on September 21, 2021    Disciplines Attending:  Bedside Nurse, ,  and Nursing Unit [address] Lizy Machado was admitted on 9/19/2021 12:40 PM    Anticipated Discharge Date:  Expected Discharge Date: 09/21/21    Disposition:    Teodoro Score:  Teodoro Scale Score: 22    Readmission Risk              Risk of Unplanned Readmission:  9           Discussed patient goal for the day, patient clinical progression, and barriers to discharge.   The following Goal(s) of the Day/Commitment(s) have been identified:  Labs - Report Results      Gina Ocasio RN  September 21, 2021

## 2021-09-21 NOTE — PROGRESS NOTES
Patient has returned back surgery, he has ambulated to the bathroom several times since he returned from surgery. His ATB has been started, pain med was given for pain level 8/10, he is resting in bed.

## 2021-09-21 NOTE — PROGRESS NOTES
Comprehensive Nutrition Assessment    Type and Reason for Visit:  Initial, Positive Nutrition Screen    Nutrition Recommendations/Plan: Once diet progresses will add ONS to help optimize nutrient needs. Nutrition Assessment:  Pt presented to the ED with evaluation of epigastric and chest pain w/ nausea. Pt adm with symptomatic cholelithiasis. History of GERD treated with Protonix. Pt currently NPO for laparoscopic, robotic assisted, cholecystectomy today. Malnutrition Assessment:  Malnutrition Status:  No malnutrition    Context:  Chronic Illness     Findings of the 6 clinical characteristics of malnutrition:  Energy Intake:  No significant decrease in energy intake  Weight Loss:  No significant weight loss     Body Fat Loss:  No significant body fat loss     Muscle Mass Loss:  No significant muscle mass loss    Fluid Accumulation:  No significant fluid accumulation     Strength:  Not Performed    Estimated Daily Nutrient Needs:  Energy (kcal):  4462-5091 (MSJx1. 2SF); Weight Used for Energy Requirements:  Current     Protein (g):  100-120g (1.3-1.5g/kg CBW); Weight Used for Protein Requirements:  Current        Fluid (ml/day):  3819-1663; Method Used for Fluid Requirements:  1 ml/kcal      Nutrition Related Findings:  A&Ox4, BS active, Abd soft/tender, +I/Os, no Edema      Wounds:  Wound Consult Pending (traumatic knee L)       Current Nutrition Therapies:    Diet NPO Exceptions are: Sips of Water with Meds    Anthropometric Measures:  · Height: 6' 2\" (188 cm)  · Current Body Weight: 183 lb (83 kg) (9/20)       · Usual Body Weight: 185 lb 9.6 oz (84.2 kg)     · Ideal Body Weight: 190 lbs; % Ideal Body Weight 96.3 %   · BMI: 23.5  · Adjusted Body Weight:  ; No Adjustment       · BMI Categories: Normal Weight (BMI 18.5-24. 9)       Nutrition Diagnosis:   · Inadequate oral intake related to altered GI function (symptomatic cholelithiasis) as evidenced by NPO or clear liquid status due to medical condition    Nutrition Interventions:   Food and/or Nutrient Delivery:  Continue NPO  Nutrition Education/Counseling:  No recommendation at this time   Coordination of Nutrition Care:  Continue to monitor while inpatient    Goals:  Nutrition Progression       Nutrition Monitoring and Evaluation:   Behavioral-Environmental Outcomes:  None Identified   Food/Nutrient Intake Outcomes:  Food and Nutrient Intake, Supplement Intake  Physical Signs/Symptoms Outcomes:  Biochemical Data, Fluid Status or Edema, Nutrition Focused Physical Findings, Skin, Weight     Discharge Planning:     Too soon to determine     Electronically signed by Ann Guadalupe RD, LD on 9/21/21 at 8:52 AM EDT    Contact: [phone]

## 2021-09-21 NOTE — ANESTHESIA POSTPROCEDURE EVALUATION
Department of Anesthesiology  Postprocedure Note    Patient: Prem Smith MRN: [id number]  YOB: 1960  Date of evaluation: 9/21/2021  Time:  5:16 PM     Procedure Summary     Date: 09/21/21 Room / Location: [facility] 09 / [facility]    Anesthesia Start: 8708 Anesthesia Stop: 4750    Procedure: CHOLECYSTECTOMY LAPAROSCOPIC ROBOTIC XI (N/A Abdomen) Diagnosis: (/)    Surgeons: Tulio Staley DO Responsible Provider: Lizy Wagner MD    Anesthesia Type: general ASA Status: 2          Anesthesia Type: general    Lucas Phase I: Lucas Score: 9    Lucas Phase II:      Last vitals: Reviewed and per EMR flowsheets.        Anesthesia Post Evaluation    Patient location during evaluation: PACU  Patient participation: complete - patient participated  Level of consciousness: awake and alert  Pain score: 5  Airway patency: patent  Nausea & Vomiting: no vomiting and no nausea  Complications: no  Cardiovascular status: hemodynamically stable  Respiratory status: spontaneous ventilation  Hydration status: stable

## 2021-09-21 NOTE — PROGRESS NOTES
Patient seen and examined. Plan for OR today for robotic cholecystectomy. Patient questions and concerns answered. Consent signed and in the chart.

## 2021-09-21 NOTE — OP NOTE
Operative Note      Patient: Ortiz Vallecillo YOB: 1960  MRN: [id number]    Date of Procedure: 9/21/2021    Pre-Op Diagnosis: Acute cholecystitis, cholelithiasis    Post-Op Diagnosis: Same       Procedure(s):  CHOLECYSTECTOMY LAPAROSCOPIC ROBOTIC XI    Surgeon(s):  Rachael Hill DO    Assistant:   Resident: MD Neli Hackett DO    Anesthesia: Monitor Anesthesia Care    Estimated Blood Loss (mL): 5cc  Complications: None    Specimens:   ID Type Source Tests Collected by Time Destination   A : GALLBLADDER Tissue Gallbladder SURGICAL PATHOLOGY Rachael Hill DO 9/21/2021 1501        Implants:  * No implants in log *      Drains: * No LDAs found *    Findings: Edematous gallbladder, acute cholecystitis, cholelithiasis    Detailed Description of Procedure:      After informed consent was obtained patient brought to operating table placed in supine position. General anesthesia was induced. Patient is prepped and draped in usual sterile fashion. Time-out was performed identifying correct patient procedure. SCDs were on and functional.  Patient received appropriate perioperative antibiotics. Left upper quadrant 8mm incision was made and Veress needle was inserted. Pneumoperitoneum was then induced which the patient tolerated. 8mm robotic optical entry port was used to visualize the abdominal wall layers with entry into the abdomen. Upon entry into the abdomen there is noted to be minimal blood on the stomach however there is no obvious puncture wound from the Veress needle. 8mm midline supraumblical incision was made and robotic 8mm trocar was placed under direct visualization. Two 8mm incisions were made in the RUQ.  8mm trocars were placed under direct visualization. All ports were placed under visualization. The robot was then brought up and docked to the patient. The camera and working instruments were inserted.  The gallbladder was identified and grasped and elevated superiorly over the liver. It appeared grossly edematous, distended concerning for acute cholecystitis. Adhesions and omentum were removed from the gallbladder with blunt dissection. Blunt dissection with [place] graspers were used to open up the peritoneum overlying the gallbladder. This was carried medially and laterally about the gallbladder to free the cystic plate. Firefly and NSA agreed was used to aid in identification of structures. Blunt dissection was used to identify the cystic duct and cystic artery. These were circumferentially dissected along the course. The critical view was obtained. The cystic duct was clipped x2 proximally and x1 distally and then divided with laparoscopic scissors. The cystic artery was also clipped and divided. Electrocautery was then utilized to dissect the gallbladder off the liver bed. Hemostasis was assured with electrocautery. Clips were again visualized and remained intact without evidence of bile or bleeding. The gallbladder fossa and right upper quadrant was then irrigated. The gallbladder was placed in a bag and removed in the left upper quadrant port site and sent for specimen. The left upper quadrant port site fascia was closed using 0 Vicryl suture in a [employer] device. The port sites were closed with 4-0 Monocryl suture in a subcuticular fashion. Dermabond was applied. The patient tolerated the procedure well was transferred to the PACU in stable condition all counts were correct. Dr. Joi Whatley was present for the procedure. Electronically signed by Kathy Vivas MD on 9/21/2021 at 4:09 PM  Attestation:    I was present during the procedure and participated in all gu aspects.     Jaye Morelos DO  09/22/21  7:44 AM

## 2021-09-22 VITALS
SYSTOLIC BLOOD PRESSURE: 154 MMHG | BODY MASS INDEX: 23.61 KG/M2 | DIASTOLIC BLOOD PRESSURE: 96 MMHG | OXYGEN SATURATION: 97 % | HEIGHT: 74 IN | TEMPERATURE: 97.8 F | HEART RATE: 72 BPM | WEIGHT: 184 LBS | RESPIRATION RATE: 16 BRPM

## 2021-09-22 LAB
ALBUMIN SERPL-MCNC: 3.7 G/DL (ref 3.5–5.2)
ALP BLD-CCNC: 92 U/L (ref 40–129)
ALT SERPL-CCNC: 25 U/L (ref 0–40)
ANION GAP SERPL CALCULATED.3IONS-SCNC: 7 MMOL/L (ref 7–16)
AST SERPL-CCNC: 36 U/L (ref 0–39)
BASOPHILS ABSOLUTE: 0.01 E9/L (ref 0–0.2)
BASOPHILS RELATIVE PERCENT: 0.1 % (ref 0–2)
BILIRUB SERPL-MCNC: 0.5 MG/DL (ref 0–1.2)
BUN BLDV-MCNC: 11 MG/DL (ref 6–23)
CALCIUM SERPL-MCNC: 8.9 MG/DL (ref 8.6–10.2)
CHLORIDE BLD-SCNC: 104 MMOL/L (ref 98–107)
CO2: 27 MMOL/L (ref 22–29)
CREAT SERPL-MCNC: 1.1 MG/DL (ref 0.7–1.2)
EOSINOPHILS ABSOLUTE: 0 E9/L (ref 0.05–0.5)
EOSINOPHILS RELATIVE PERCENT: 0 % (ref 0–6)
GFR AFRICAN AMERICAN: >60
GFR NON-AFRICAN AMERICAN: >60 ML/MIN/1.73
GLUCOSE BLD-MCNC: 186 MG/DL (ref 74–99)
HCT VFR BLD CALC: 40 % (ref 37–54)
HEMOGLOBIN: 13.5 G/DL (ref 12.5–16.5)
IMMATURE GRANULOCYTES #: 0.07 E9/L
IMMATURE GRANULOCYTES %: 0.6 % (ref 0–5)
LYMPHOCYTES ABSOLUTE: 0.72 E9/L (ref 1.5–4)
LYMPHOCYTES RELATIVE PERCENT: 5.8 % (ref 20–42)
MCH RBC QN AUTO: 30.8 PG (ref 26–35)
MCHC RBC AUTO-ENTMCNC: 33.8 % (ref 32–34.5)
MCV RBC AUTO: 91.3 FL (ref 80–99.9)
MONOCYTES ABSOLUTE: 0.7 E9/L (ref 0.1–0.95)
MONOCYTES RELATIVE PERCENT: 5.6 % (ref 2–12)
MRSA CULTURE ONLY: NORMAL
NEUTROPHILS ABSOLUTE: 10.95 E9/L (ref 1.8–7.3)
NEUTROPHILS RELATIVE PERCENT: 87.9 % (ref 43–80)
PDW BLD-RTO: 11.9 FL (ref 11.5–15)
PLATELET # BLD: 267 E9/L (ref 130–450)
PMV BLD AUTO: 11.4 FL (ref 7–12)
POTASSIUM REFLEX MAGNESIUM: 5 MMOL/L (ref 3.5–5)
RBC # BLD: 4.38 E12/L (ref 3.8–5.8)
SODIUM BLD-SCNC: 138 MMOL/L (ref 132–146)
TOTAL PROTEIN: 6.5 G/DL (ref 6.4–8.3)
WBC # BLD: 12.5 E9/L (ref 4.5–11.5)

## 2021-09-22 PROCEDURE — 96366 THER/PROPH/DIAG IV INF ADDON: CPT

## 2021-09-22 PROCEDURE — 6370000000 HC RX 637 (ALT 250 FOR IP): Performed by: SURGERY

## 2021-09-22 PROCEDURE — 36415 COLL VENOUS BLD VENIPUNCTURE: CPT

## 2021-09-22 PROCEDURE — 6360000002 HC RX W HCPCS: Performed by: SURGERY

## 2021-09-22 PROCEDURE — 85025 COMPLETE CBC W/AUTO DIFF WBC: CPT

## 2021-09-22 PROCEDURE — 2500000003 HC RX 250 WO HCPCS: Performed by: SURGERY

## 2021-09-22 PROCEDURE — 80053 COMPREHEN METABOLIC PANEL: CPT

## 2021-09-22 PROCEDURE — 2580000003 HC RX 258: Performed by: SURGERY

## 2021-09-22 PROCEDURE — C9113 INJ PANTOPRAZOLE SODIUM, VIA: HCPCS | Performed by: SURGERY

## 2021-09-22 PROCEDURE — 96376 TX/PRO/DX INJ SAME DRUG ADON: CPT

## 2021-09-22 RX ADMIN — WATER 2000 MG: 1 INJECTION INTRAMUSCULAR; INTRAVENOUS; SUBCUTANEOUS at 07:56

## 2021-09-22 RX ADMIN — METRONIDAZOLE 500 MG: 500 INJECTION, SOLUTION INTRAVENOUS at 09:34

## 2021-09-22 RX ADMIN — SODIUM CHLORIDE, PRESERVATIVE FREE 10 ML: 5 INJECTION INTRAVENOUS at 07:56

## 2021-09-22 RX ADMIN — METRONIDAZOLE 500 MG: 500 INJECTION, SOLUTION INTRAVENOUS at 01:51

## 2021-09-22 RX ADMIN — ACETAMINOPHEN 650 MG: 325 TABLET ORAL at 01:51

## 2021-09-22 RX ADMIN — PANTOPRAZOLE SODIUM 40 MG: 40 INJECTION, POWDER, FOR SOLUTION INTRAVENOUS at 07:56

## 2021-09-22 ASSESSMENT — PAIN SCALES - GENERAL: PAINLEVEL_OUTOF10: 1

## 2021-09-22 NOTE — DISCHARGE SUMMARY
Physician Discharge Summary     Patient ID:  Lilian Montes  [id number]  57 y.o.  1960    Admit date: 9/19/2021    Discharge date and time: No discharge date for patient encounter. Admitting Physician: Julius Amin DO     Admission Diagnoses: Choledocholithiasis [K80.50]  Acute cholecystitis [K81.0]    Discharge Diagnoses: Principal Problem:    Acute cholecystitis due to biliary calculus  Active Problems:    Acute cholecystitis  Resolved Problems:    * No resolved hospital problems. *      Admission Condition: poor    Discharged Condition: stable      Hospital Course:  Lilian Hatch is a 57 y.o. male who presented with epigastric and chest pain. Work up revealed symptomatic cholelithiasis. He underwent robotic assisted laparoscopic cholecystectomy on 9/21. The patient's course was otherwise uneventful. He progressed well, pain was controlled on PO medications. He was tolerating a regular diet with no nausea or vomiting, and was in a suitable condition for discharge to home in stable condition. Consults:   None    Significant Diagnostic Studies:   CT ABDOMEN PELVIS WO CONTRAST Additional Contrast? None    Result Date: 9/19/2021  EXAMINATION: CT OF THE ABDOMEN AND PELVIS WITHOUT CONTRAST 9/19/2021 8:14 am TECHNIQUE: CT of the abdomen and pelvis was performed without the administration of intravenous contrast. Multiplanar reformatted images are provided for review. Dose modulation, iterative reconstruction, and/or weight based adjustment of the mA/kV was utilized to reduce the radiation dose to as low as reasonably achievable. COMPARISON: None.  HISTORY: ORDERING SYSTEM PROVIDED HISTORY: epigastric pain TECHNOLOGIST PROVIDED HISTORY: Reason for exam:->epigastric pain Additional Contrast?->None Decision Support Exception - unselect if not a suspected or confirmed emergency medical condition->Emergency Medical Condition (MA) FINDINGS: Lower Chest:  Visualized portion of the lower chest demonstrates no acute abnormality. Organs: The liver, spleen, pancreas and adrenal glands are unremarkable. There is no right or left obstructive uropathy. There are no findings of pyelonephritis. Note is made of a 7 mm cyst seen within the left kidney. There is no calculus seen within the course of the ureters. There is contrast within the urinary bladder which is not distended. GI/Bowel: There is a calcified stones seen within the gallbladder neck measuring 1.6 cm. The gallbladder wall is thickened and there is minimal pericholecystic fluid adjacent to the gallbladder neck. The common bile duct is not dilated. There is no evidence of choledocholithiasis. The stomach is normally distended. There is a very small hiatal hernia. There is no large or small bowel obstruction. Pelvis:  Bladder is unremarkable in appearance. There is no pelvic inflammatory process. The appendix is visualized and is unremarkable. No evidence of acute appendicitis. Peritoneum/Retroperitoneum:  No evidence of retroperitoneal lymphadenopathy. Bones/Soft Tissues:  No acute abnormality of the visualized osseous structures. 1.  There is a 1.6 cm stone lodged within the gallbladder neck. The gallbladder wall is thickened and there is minimal pericholecystic fluid. Please correlate with right upper quadrant pain and physical examination. Gallbladder ultrasound is recommended. 2.  Very small hiatal hernia. XR CHEST PORTABLE    Result Date: 9/19/2021  EXAMINATION: ONE XRAY VIEW OF THE CHEST 9/19/2021 6:52 am COMPARISON: 12/17/2015 frontal radiograph associated with rib series. HISTORY: ORDERING SYSTEM PROVIDED HISTORY: pain TECHNOLOGIST PROVIDED HISTORY: Reason for exam:->pain FINDINGS: Stable normal cardiomediastinal silhouette and pulmonary vasculature. No pneumothorax, pleural effusion, overt pulmonary edema or evidence of pneumonia. No acute osseous abnormality. Negative single view chest for acute process.      CTA PULMONARY W CONTRAST    Result Date: 9/19/2021  EXAMINATION: CTA OF THE CHEST 9/19/2021 7:32 am TECHNIQUE: CTA of the chest was performed after the administration of intravenous contrast.  Multiplanar reformatted images are provided for review. MIP images are provided for review. Dose modulation, iterative reconstruction, and/or weight based adjustment of the mA/kV was utilized to reduce the radiation dose to as low as reasonably achievable. COMPARISON: None. HISTORY: ORDERING SYSTEM PROVIDED HISTORY: rule out PE TECHNOLOGIST PROVIDED HISTORY: Reason for exam:->rule out PE Decision Support Exception - unselect if not a suspected or confirmed emergency medical condition->Emergency Medical Condition (MA) FINDINGS: Pulmonary Arteries: Pulmonary arteries are adequately opacified for evaluation. No evidence of intraluminal filling defect to suggest pulmonary embolism. Main pulmonary artery is normal in caliber. Mediastinum: Ascending aorta is around upper limits of normal size Lungs/pleura: Minimal basilar atelectasis and apical scarring and emphysema Upper Abdomen: Cholelithiasis a Soft Tissues/Bones: No acute bone or soft tissue abnormality. No evidence of pulmonary embolism or acute pulmonary abnormality. Discharge Exam:  Vitals:     09/20/21 0335   BP: 122/72   Pulse: 73   Resp: 16   Temp: 98.5 °F (36.9 °C)   SpO2:           General Appearance:  awake, alert, oriented, in no acute distress  Skin:  Skin color, texture, turgor normal. No rashes or lesions. Head/face:  NCAT  Eyes:  No gross abnormalities. , PERRL, EOMI and Sclera nonicteric  Lungs/Chest:  Normal expansion. No chest wall tenderness  Heart:  Heart regular rate. No chest pain  Abdomen:  Soft, non tender, mildly distended. Negative Caruso sign No palpable organomegaly or masses. Extremities: Extremities warm to touch, pink, with no edema. Rectal:  Rectal exam deferred.       Disposition: home    In process/preliminary results:  Outstanding Order Results Date and Time Order Name Status Description    9/20/2021  8:47 PM Culture, MRSA, Screening In process           Patient Instructions:   Current Discharge Medication List      START taking these medications    Details   oxyCODONE (ROXICODONE) 5 MG immediate release tablet Take 1 tablet by mouth every 6 hours as needed for Pain for up to 3 days. Intended supply: 3 days. Take lowest dose possible to manage pain  Qty: 12 tablet, Refills: 0    Comments: Reduce doses taken as pain becomes manageable  Associated Diagnoses: S/P laparoscopic cholecystectomy         CONTINUE these medications which have NOT CHANGED    Details   omeprazole (PRILOSEC) 40 MG delayed release capsule Take 1 capsule by mouth every morning (before breakfast)  Qty: 30 capsule, Refills: 5             A Cholecystectomy is the surgical removal of the gallbladder. Home Care    · You have glue on your incisions-Do not apply lotion, gel, or liquid to wound while the glue is in place. · Place ice on incisions to decrease the pain and bruising. Diet    · You will be started on a clear-liquid diet after surgery and advanced to a regular diet, depending on your progress and tolerance. Your liver will take over the functions of the gallbladder, although some people notice that they have a little more trouble digesting fatty foods, particularly for the first month after surgery. You may notice increased gas or changes in your bowel habits during the first month after surgery. Keep the bowels soft with Metamucil and bran cereal.    Physical Activity    · Walk frequently to prevent blood clots in the legs but do not do anything that causes pain in the incisions. · Breath deeply every hour to prevent pneumonia. Medications    · Restart blood thinners in 24 hours if no sign of bleeding  · Take Ibuprofen and Tylenol for moderate pain. · Use the perscribed medication only for more severe pain. Prescriptions will be sent with you. Use as directed. When taking pain medications, you may experience dizziness or drowsiness. Do not drink alcohol or drive when taking these medications  · Do not share them    Call  For any of the Following Occurs     · Signs of infection, including fever and chills   · Redness, swelling, increasing pain, excessive bleeding, or discharge at the incision site   · Cough, shortness of breath, chest pain   · Increased abdominal pain   · Nausea and/or vomiting that does not resolve off narcotics. · Pain, burning, urgency or frequency of urination, or blood in the urine   · Pain and/or swelling in your feet, calves, or legs   · Dark urine, light stools, or evidence of jaundice (yellowing of the skin or eyes). In case of an emergency,    CALL 911  Immediately.        Follow up:   Clearance Severin, DO  [address] [place] [address]. [place] [address] [address] [facility]    Schedule an appointment as soon as possible for a visit in 2 weeks  For wound re-check       Signed:  Tanvi Muir MD  9/22/2021  7:37 AM

## 2021-09-22 NOTE — PROGRESS NOTES
P Quality Flow/Interdisciplinary Rounds Progress Note        Quality Flow Rounds held on September 22, 2021    Disciplines Attending:  Bedside Nurse, ,  and Nursing Unit [address] Lizy Machado was admitted on 9/19/2021 12:40 PM    Anticipated Discharge Date:  Expected Discharge Date: 09/21/21    Disposition:    Teodoro Score:  Teodoro Scale Score: 22    Readmission Risk              Risk of Unplanned Readmission:  10           Discussed patient goal for the day, patient clinical progression, and barriers to discharge.   The following Goal(s) of the Day/Commitment(s) have been identified:  Discharge order in      [address] Justyna Ferrari N RN  September 22, 2021

## 2021-09-23 NOTE — ADT AUTH CERT
Utilization Reviews       Cholecystectomy - Clinical Indications for Procedure by Topher Christopher RN       Review Status Review Entered   Completed 9/23/2021 14:50      Criteria Review      Clinical Indications for Procedure    Most Recent : Demetra Washington Most Recent Date: 9/23/2021 2:50 PM EDT    (X) Procedure is indicated for  1 or more  of the following  (1) (2) (3) (4):       (X) Symptomatic gallstone disease, as indicated by  ALL  of the following :          (X) Evidence of gallstone disease, as indicated by  1 or more  of the following :             (X) Stones demonstrated on cholecystography, ultrasound, CT, or MRI             9/23/2021 2:50 PM EDT by Demetra Washington               Impression  1. Orbarbaralla Maria De Jesusn is a 1.6 cm stone lodged within the gallbladder neck.  The  gallbladder wall is thickened and there is minimal pericholecystic fluid. Please correlate with right upper quadrant pain and physical examination. Gallbladder ultrasound          (X) Findings consistent with gallstone disease, as indicated by  1 or more  of the following :             (X) Biliary colic   Additional Notes   9/21/21      Dietician Note:      Nutrition Recommendations/Plan: Once diet progresses will add ONS to help optimize nutrient needs.       Nutrition Assessment:  Pt presented to the ED with evaluation of epigastric and chest pain w/ nausea. Pt adm with symptomatic cholelithiasis. History of GERD treated with Protonix.  Pt currently NPO for laparoscopic, robotic assisted, cholecystectomy today.       Malnutrition Assessment:   Malnutrition Status:  No malnutrition     Context:  Chronic Illness      Findings of the 6 clinical characteristics of malnutrition:   Energy Intake:  No significant decrease in energy intake   Weight Loss:  No significant weight loss      Body Fat Loss:  No significant body fat loss      Muscle Mass Loss:  No significant muscle mass loss     Fluid Accumulation:  No significant fluid accumulation      Strength:  Not Performed      General Surgery Note:          Date of Procedure: 9/21/2021       Pre-Op Diagnosis: Acute cholecystitis, cholelithiasis       Post-Op Diagnosis: Same         Procedure(s):   CHOLECYSTECTOMY LAPAROSCOPIC ROBOTIC XI       Surgeon(s):   Julius Amin DO       Assistant:    Resident: MD Suresh Estrada DO       Anesthesia: Monitor Anesthesia Care       Estimated Blood Loss (mL): 5cc   Complications: None       Specimens:    ID Type Source Tests Collected by Time Destination   A : GALLBLADDER Tissue Gallbladder SURGICAL PATHOLOGY Julius Amin DO 9/21/2021 1501             Implants:   * No implants in log *        Drains: * No LDAs found *       Findings: Edematous gallbladder, acute cholecystitis, cholelithiasis       Results for Jamil Santoyo SR. (MRN [id number]) as of 9/23/2021 14:46      9/21/2021 00:00   SURGICAL PATHOLOGY: Rpt      9/21/2021 05:28   Sodium: 142   Potassium: 3.7   Chloride: 106   CO2: 28   BUN: 8   Creatinine: 1.1   Anion Gap: 8   GFR Non-: >60   GFR African American: >60   Glucose: 95   Calcium: 9.1   Total Protein: 6.2 (L)   Albumin: 3.5   Alk Phos: 85   ALT: 16   AST: 18   Bilirubin: 0.7   WBC: 9.9   RBC: 4.30   Hemoglobin Quant: 13.3   Hematocrit: 39.4   MCV: 91.6   MCH: 30.9   MCHC: 33.8   MPV: 11.8   RDW: 12.1   Platelet Count: 947   Neutrophils %: 61.6   Immature Granulocytes %: 0.3   Lymphocyte %: 21.4   Monocytes %: 9.5   Eosinophils %: 6.5 (H)   Basophils %: 0.7   Neutrophils Absolute: 6.12   Immature Granulocytes #: 0.03   Lymphocytes Absolute: 2.12   Monocytes Absolute: 0.94   Eosinophils Absolute: 0.64 (H)   Basophils Absolute: 0.07         cefTRIAXone (ROCEPHIN) 2,000 mg in sterile water 20 mL IV syringe    Dose: 2,000 mg   Freq: DAILY Route: IV      metronidazole (FLAGYL) 500 mg in NaCl 100 mL IVPB premix    Dose: 500 mg   Freq: EVERY 8 HOURS Route: IV      pantoprazole (PROTONIX) injection 40 mg    Dose: 40 mg 2,000 mg in sterile water 20 mL IV syringe   Dose: 2,000 mg  Freq: DAILY Route: IV    metronidazole (FLAGYL) 500 mg in NaCl 100 mL IVPB premix   Dose: 500 mg  Freq: EVERY 8 HOURS Route: IV    * Milestone     Utilization Reviews       Cholecystectomy - Clinical Indications for Procedure by Ernestina Thompson RN       Review Status Review Entered   Completed 9/23/2021 14:50      Criteria Review      Clinical Indications for Procedure    Most Recent : Lenora Lopez Most Recent Date: 9/23/2021 2:50 PM EDT    (X) Procedure is indicated for  1 or more  of the following  (1) (2) (3) (4):       (X) Symptomatic gallstone disease, as indicated by  ALL  of the following :          (X) Evidence of gallstone disease, as indicated by  1 or more  of the following :             (X) Stones demonstrated on cholecystography, ultrasound, CT, or MRI             9/23/2021 2:50 PM EDT by Lenora Lopez               Impression  1. Samuel Muniz is a 1.6 cm stone lodged within the gallbladder neck.  The  gallbladder wall is thickened and there is minimal pericholecystic fluid. Please correlate with right upper quadrant pain and physical examination. Gallbladder ultrasound          (X) Findings consistent with gallstone disease, as indicated by  1 or more  of the following :             (X) Biliary colic   Additional Notes   9/21/21      Dietician Note:      Nutrition Recommendations/Plan: Once diet progresses will add ONS to help optimize nutrient needs.       Nutrition Assessment:  Pt presented to the ED with evaluation of epigastric and chest pain w/ nausea. Pt adm with symptomatic cholelithiasis. History of GERD treated with Protonix.  Pt currently NPO for laparoscopic, robotic assisted, cholecystectomy today.       Malnutrition Assessment:   Malnutrition Status:  No malnutrition     Context:  Chronic Illness      Findings of the 6 clinical characteristics of malnutrition:   Energy Intake:  No significant decrease in energy intake   Weight Loss:  No significant weight loss      Body Fat Loss:  No significant body fat loss      Muscle Mass Loss:  No significant muscle mass loss     Fluid Accumulation:  No significant fluid accumulation      Strength:  Not Performed      General Surgery Note:          Date of Procedure: 9/21/2021       Pre-Op Diagnosis: Acute cholecystitis, cholelithiasis       Post-Op Diagnosis: Same         Procedure(s):   CHOLECYSTECTOMY LAPAROSCOPIC ROBOTIC XI       Surgeon(s):   Que Harding DO       Assistant:    Resident: MD Bakari Hernandez DO       Anesthesia: Monitor Anesthesia Care       Estimated Blood Loss (mL): 5cc   Complications: None       Specimens:    ID Type Source Tests Collected by Time Destination   A : GALLBLADDER Tissue Gallbladder SURGICAL PATHOLOGY Que Harding DO 9/21/2021 1501             Implants:   * No implants in log *        Drains: * No LDAs found *       Findings: Edematous gallbladder, acute cholecystitis, cholelithiasis       Results for Nesha Hansen  (MRN [id number]) as of 9/23/2021 14:46      9/21/2021 00:00   SURGICAL PATHOLOGY: Rpt      9/21/2021 05:28   Sodium: 142   Potassium: 3.7   Chloride: 106   CO2: 28   BUN: 8   Creatinine: 1.1   Anion Gap: 8   GFR Non-: >60   GFR African American: >60   Glucose: 95   Calcium: 9.1   Total Protein: 6.2 (L)   Albumin: 3.5   Alk Phos: 85   ALT: 16   AST: 18   Bilirubin: 0.7   WBC: 9.9   RBC: 4.30   Hemoglobin Quant: 13.3   Hematocrit: 39.4   MCV: 91.6   MCH: 30.9   MCHC: 33.8   MPV: 11.8   RDW: 12.1   Platelet Count: 934   Neutrophils %: 61.6   Immature Granulocytes %: 0.3   Lymphocyte %: 21.4   Monocytes %: 9.5   Eosinophils %: 6.5 (H)   Basophils %: 0.7   Neutrophils Absolute: 6.12   Immature Granulocytes #: 0.03   Lymphocytes Absolute: 2.12   Monocytes Absolute: 0.94   Eosinophils Absolute: 0.64 (H)   Basophils Absolute: 0.07         cefTRIAXone (ROCEPHIN) 2,000 mg in sterile water 20 mL IV syringe Dose: 2,000 mg   Freq: DAILY Route: IV      metronidazole (FLAGYL) 500 mg in NaCl 100 mL IVPB premix    Dose: 500 mg   Freq: EVERY 8 HOURS Route: IV      pantoprazole (PROTONIX) injection 40 mg    Dose: 40 mg   Freq: DAILY Route: IV      lactated ringers infusion    Rate: 50 mL/hr Freq: CONTINUOUS Route: IV      HYDROmorphone (DILAUDID) injection 0.5 mg    Dose: 0.5 mg   Freq: EVERY 3 HOURS PRN Route: IV x 3      Gallbladder or Bile Duct Inflammation or Stone - Care Day 3 (9/21/2021) by Joe Aguirre RN       Review Status Review Entered   Completed 9/23/2021 14:48      Criteria Review      Care Day: 3 Care Date: 9/21/2021 Level of Care: Inpatient Floor    Guideline Day 3    Clinical Status    (X) * Hemodynamic stability    9/23/2021 2:48 PM EDT by Margarito Aguilar      143/74 87 16 98.3 97% room air    (X) * Afebrile or temperature acceptable for next level of care    9/23/2021 2:48 PM EDT by Wash New      98.6-98.3-98.5-97.6    (X) * Nausea and vomiting absent or controlled and acceptable for next level of care    (X) * Pain absent or managed    (X) * Laboratory test results normal or acceptable for next level of care    9/23/2021 2:48 PM EDT by Joni Johnson Results for Romilda Romberg. (MRN [id number]) as of 9/23/2021 14:46    9/21/2021 05:28  Sodium: 142  Potassium: 3.7  Chloride: 106  CO2: 28  BUN: 8  Creatinine: 1.1  Anion Gap: 8  GFR Non-: >60  GFR African American: >60  Glucose: 95  Calcium: 9.1    ( ) * Discharge plans and education understood    Activity    (X) * Ambulatory or acceptable for next level of care    Routes    ( ) * Oral hydration    9/23/2021 2:48 PM EDT by Margarito Aguilar      NPO for procedure    lactated ringers infusion   Rate: 50 mL/hr Freq: CONTINUOUS Route: IV    ( ) * Oral medication or regimen acceptable for next level of care    (X) * Oral diet or acceptable for next level of care    9/23/2021 2:48 PM EDT by Wash New      ADULT DIET; Regular;  Low Fat (less than or equal to 50 gm/day) [[id number]]    Medications    ( ) * Antibiotics absent or administration arranged for next level of care    9/23/2021 2:48 PM EDT by Margarito Aguilar      cefTRIAXone (ROCEPHIN) 2,000 mg in sterile water 20 mL IV syringe   Dose: 2,000 mg  Freq: DAILY Route: IV    metronidazole (FLAGYL) 500 mg in NaCl 100 mL IVPB premix   Dose: 500 mg  Freq: EVERY 8 HOURS Route: IV    * Milestone

## 2021-10-29 ENCOUNTER — TELEPHONE (OUTPATIENT)
Dept: SURGERY | Age: 61
End: 2021-10-29

## 2021-10-29 NOTE — TELEPHONE ENCOUNTER
sched 11/1/21 for Post-op cholecystectomy. Need to reschedule due to lack of transportation. Please advise patient.  [phone]

## 2021-11-08 ENCOUNTER — OFFICE VISIT (OUTPATIENT)
Dept: SURGERY | Age: 61
End: 2021-11-08

## 2021-11-08 VITALS — RESPIRATION RATE: 16 BRPM | HEIGHT: 74 IN | BODY MASS INDEX: 23.1 KG/M2 | WEIGHT: 180 LBS

## 2021-11-08 DIAGNOSIS — Z09 POSTOPERATIVE EXAMINATION: Primary | ICD-10-CM

## 2021-11-08 PROCEDURE — 99024 POSTOP FOLLOW-UP VISIT: CPT | Performed by: SURGERY

## 2021-11-09 NOTE — PROGRESS NOTES
Patient presents for postoperative visit. He has no complaints. Is tolerating diet and bowels are functioning well. On exam all incisions are healed appropriately. Overall he is doing well. Activity as tolerated. Follow-up as needed.

## 2021-12-20 ENCOUNTER — APPOINTMENT (OUTPATIENT)
Dept: GENERAL RADIOLOGY | Age: 61
End: 2021-12-20
Payer: MEDICARE

## 2021-12-20 ENCOUNTER — HOSPITAL ENCOUNTER (OUTPATIENT)
Age: 61
Setting detail: OBSERVATION
Discharge: HOME | End: 2021-12-21
Attending: EMERGENCY MEDICINE | Admitting: FAMILY MEDICINE
Payer: MEDICARE

## 2021-12-20 DIAGNOSIS — I47.1 PAROXYSMAL SUPRAVENTRICULAR TACHYCARDIA (HCC): Primary | ICD-10-CM

## 2021-12-20 PROBLEM — I21.4 NSTEMI (NON-ST ELEVATED MYOCARDIAL INFARCTION) (HCC): Status: ACTIVE | Noted: 2021-12-20

## 2021-12-20 LAB
ANION GAP SERPL CALCULATED.3IONS-SCNC: 9 MMOL/L (ref 7–16)
BASOPHILS ABSOLUTE: 0.08 E9/L (ref 0–0.2)
BASOPHILS RELATIVE PERCENT: 0.9 % (ref 0–2)
BUN BLDV-MCNC: 11 MG/DL (ref 6–23)
CALCIUM SERPL-MCNC: 9.2 MG/DL (ref 8.6–10.2)
CHLORIDE BLD-SCNC: 108 MMOL/L (ref 98–107)
CO2: 25 MMOL/L (ref 22–29)
CREAT SERPL-MCNC: 1.4 MG/DL (ref 0.7–1.2)
EOSINOPHILS ABSOLUTE: 0.33 E9/L (ref 0.05–0.5)
EOSINOPHILS RELATIVE PERCENT: 3.7 % (ref 0–6)
GFR AFRICAN AMERICAN: >60
GFR NON-AFRICAN AMERICAN: 51 ML/MIN/1.73
GLUCOSE BLD-MCNC: 124 MG/DL (ref 74–99)
HCT VFR BLD CALC: 44.9 % (ref 37–54)
HEMOGLOBIN: 15.2 G/DL (ref 12.5–16.5)
IMMATURE GRANULOCYTES #: 0.04 E9/L
IMMATURE GRANULOCYTES %: 0.5 % (ref 0–5)
LYMPHOCYTES ABSOLUTE: 1.86 E9/L (ref 1.5–4)
LYMPHOCYTES RELATIVE PERCENT: 21.1 % (ref 20–42)
MAGNESIUM: 1.9 MG/DL (ref 1.6–2.6)
MCH RBC QN AUTO: 30.6 PG (ref 26–35)
MCHC RBC AUTO-ENTMCNC: 33.9 % (ref 32–34.5)
MCV RBC AUTO: 90.3 FL (ref 80–99.9)
MONOCYTES ABSOLUTE: 0.76 E9/L (ref 0.1–0.95)
MONOCYTES RELATIVE PERCENT: 8.6 % (ref 2–12)
NEUTROPHILS ABSOLUTE: 5.75 E9/L (ref 1.8–7.3)
NEUTROPHILS RELATIVE PERCENT: 65.2 % (ref 43–80)
PDW BLD-RTO: 12 FL (ref 11.5–15)
PLATELET # BLD: 259 E9/L (ref 130–450)
PMV BLD AUTO: 11.8 FL (ref 7–12)
POTASSIUM SERPL-SCNC: 4 MMOL/L (ref 3.5–5)
RBC # BLD: 4.97 E12/L (ref 3.8–5.8)
SODIUM BLD-SCNC: 142 MMOL/L (ref 132–146)
TROPONIN, HIGH SENSITIVITY: 12 NG/L (ref 0–11)
TROPONIN, HIGH SENSITIVITY: 20 NG/L (ref 0–11)
TROPONIN, HIGH SENSITIVITY: 30 NG/L (ref 0–11)
TROPONIN, HIGH SENSITIVITY: 31 NG/L (ref 0–11)
TSH SERPL DL<=0.05 MIU/L-ACNC: 0.33 UIU/ML (ref 0.27–4.2)
WBC # BLD: 8.8 E9/L (ref 4.5–11.5)

## 2021-12-20 PROCEDURE — G0378 HOSPITAL OBSERVATION PER HR: HCPCS

## 2021-12-20 PROCEDURE — 83735 ASSAY OF MAGNESIUM: CPT

## 2021-12-20 PROCEDURE — 93005 ELECTROCARDIOGRAM TRACING: CPT | Performed by: STUDENT IN AN ORGANIZED HEALTH CARE EDUCATION/TRAINING PROGRAM

## 2021-12-20 PROCEDURE — 84484 ASSAY OF TROPONIN QUANT: CPT

## 2021-12-20 PROCEDURE — 99284 EMERGENCY DEPT VISIT MOD MDM: CPT

## 2021-12-20 PROCEDURE — 84443 ASSAY THYROID STIM HORMONE: CPT

## 2021-12-20 PROCEDURE — 80048 BASIC METABOLIC PNL TOTAL CA: CPT

## 2021-12-20 PROCEDURE — 36415 COLL VENOUS BLD VENIPUNCTURE: CPT

## 2021-12-20 PROCEDURE — 71045 X-RAY EXAM CHEST 1 VIEW: CPT

## 2021-12-20 PROCEDURE — 85025 COMPLETE CBC W/AUTO DIFF WBC: CPT

## 2021-12-20 RX ORDER — IBUPROFEN 200 MG
400 TABLET ORAL EVERY 6 HOURS PRN
COMMUNITY

## 2021-12-20 RX ORDER — LANOLIN ALCOHOL/MO/W.PET/CERES
1 CREAM (GRAM) TOPICAL DAILY
COMMUNITY

## 2021-12-20 ASSESSMENT — ENCOUNTER SYMPTOMS
RHINORRHEA: 0
APNEA: 0
WHEEZING: 0
SORE THROAT: 0
EYE PAIN: 0
BACK PAIN: 0
EYE REDNESS: 0
COUGH: 0
PHOTOPHOBIA: 0
TROUBLE SWALLOWING: 0
ABDOMINAL PAIN: 0
CONSTIPATION: 0
VOMITING: 0
NAUSEA: 0
CHEST TIGHTNESS: 0
SHORTNESS OF BREATH: 0
DIARRHEA: 0

## 2021-12-20 NOTE — ED PROVIDER NOTES
HISTORY ---------------------------------------------  Past Medical History:  has a past medical history of Chronic back pain, GERD (gastroesophageal reflux disease), Herniated nucleus pulposus, L2-3 left, Herniated nucleus pulposus, L4-5, Inguinal hernia, Left shoulder pain, and Sprain and strain of lumbosacral joint/ligament. Past Surgical History:  has a past surgical history that includes back surgery (7/28/03); Foot surgery (Left, 1980's); Mouth surgery; Tonsillectomy; Upper gastrointestinal endoscopy (2014); other surgical history (Left, 1990's); other surgical history (Right, 11/185/2014); hernia repair (Right, 11/18/2014); Colonoscopy (2014 ); Inguinal hernia repair (8/`14/15); Upper gastrointestinal endoscopy (N/A, 9/3/2020); and Cholecystectomy, laparoscopic (N/A, 9/21/2021). Social History:  reports that he has been smoking. He has a 15.00 pack-year smoking history. He has never used smokeless tobacco. He reports previous drug use. Drugs: Marijuana (Weed) and Cocaine. He reports that he does not drink alcohol. Family History: family history includes Blindness in his son; Cancer in his brother and father; High Blood Pressure in his mother; Other (age of onset: 10) in his son. Unless otherwise noted, family history is non contributory    The patients home medications have been reviewed. Allergies: Patient has no known allergies. Physical Exam:  Constitutional/General: Alert and oriented x3  Head: Normocephalic and atraumatic  Eyes: PERRL, EOMI, sclera non icteric  ENT: Oropharynx clear, handling secretions  Neck: Supple, full ROM, no stridor, no meningeal signs  Respiratory: Lungs clear to auscultation bilaterally, no wheezes, rales, or rhonchi. Not in respiratory distress  Cardiovascular:  Regular rate. Regular rhythm. No murmurs, no gallops, no rubs. 2+ distal pulses. Equal extremity pulses. GI:  Abdomen Soft, Non tender, Non distended. No rebound, guarding, or rigidity.  No pulsatile masses. Musculoskeletal: Moves all extremities x 4. Warm and well perfused,  no clubbing, no cyanosis, no edema. Palpable peripheral pulses  Integument: skin warm and dry. No rashes. Neurologic: GCS 15, no focal deficits  Psychiatric: Normal Affect      I directly supervised any procedures performed by the resident and was present for the procedure including all critical portions of the procedure      The cardiac monitor revealed sinus rhythm with a heart rate in the 70s as interpreted by me. The cardiac monitor was ordered secondary to the patient's SVT and to monitor the patient for dysrhythmia. CPT [id number]      I, Dr. Matty Powers, am the primary provider of record    My Medical Decision Making:         PSVT  Converted to sinus after EMS gave 6mg of Adenosine  He is back to normal  He denies complaints  He says he feels fine  Trop 12, second 20, repeat pending. Repeat trop determines disposition based on delta. Delta pending. Again, he has no chest pain or shortness of breath.         1. Paroxysmal supraventricular tachycardia ([facility] [address]           Zulay Blackmon MD  12/20/21 2521

## 2021-12-20 NOTE — H&P
Hospitalist History & Physical      PCP: No primary care provider on file. Date of Service: Pt seen/examined on 12/20/2021 and is Placed in Observation. Chief Complaint:  had concerns including Tachycardia (pt was at PCP for routine visit and HR was in the 200s. 6mg of adenosine given pta, HR 89 in triage. no complaints at this time). History Of Present Illness:    Mr. Neda Arvizu, a 57y.o. year old male  who  has a past medical history of Chronic back pain, GERD (gastroesophageal reflux disease), Herniated nucleus pulposus, L2-3 left, Herniated nucleus pulposus, L4-5, Inguinal hernia, Left shoulder pain, and Sprain and strain of lumbosacral joint/ligament. Briefly, this is a 25-year-old male with history of GERD who presents to the ER via EMS. Patient reports he was at the PCPs office for follow-up however was noted to have tachycardia with heart rates from 200/220. EMS was called and patient was given 6 mg of IV adenosine where he converted back to sinus rhythm. Patient reports he had no symptoms of lightheadedness palpitations chest pain dizziness. He reports he is on no daily medications. He reports he sees a new physician with [facility].  He is a daily smoker. He smokes 1 pack over 4 days. Patient's work-up in the ER was fairly unrevealing. Patient has serum creatinine of 1.4 with unknown baseline. Patient's TSH was unremarkable.   Patient's troponin however trended up in the ER which warranted an observation overnight      Past Medical History:   Diagnosis Date    Chronic back pain     GERD (gastroesophageal reflux disease) 10/10/2014    Herniated nucleus pulposus, L2-3 left     Herniated nucleus pulposus, L4-5     Inguinal hernia 8-14-15    Right, for repair/revision    Left shoulder pain 10/10/2014    Since 9/2014    Sprain and strain of lumbosacral joint/ligament        Past Surgical History:   Procedure Laterality Date    BACK SURGERY  7/28/03    LLAM L3-4 left by Dr. Gokul Busby, LAPAROSCOPIC N/A 9/21/2021    CHOLECYSTECTOMY LAPAROSCOPIC ROBOTIC XI performed by Andrew Dickens DO at [facility] COLONOSCOPY  2014     normal    FOOT SURGERY Left 1980's    to remove pinched nerve    HERNIA REPAIR Right 11/18/2014    inguinal- laproscopic    INGUINAL HERNIA REPAIR  8/`14/15    right     MOUTH SURGERY      multiple surgeries    OTHER SURGICAL HISTORY Left 1990's    thumb to repair traumatic injury    OTHER SURGICAL HISTORY Right 11/185/2014    laparoscopic right inguinal hernia repair    TONSILLECTOMY      UPPER GASTROINTESTINAL ENDOSCOPY  2014    normal    UPPER GASTROINTESTINAL ENDOSCOPY N/A 9/3/2020    EGD BIOPSY performed by Andrew Dickens DO at [address]       Prior to Admission medications    Medication Sig Start Date End Date Taking? Authorizing Provider   ibuprofen (ADVIL;MOTRIN) 200 MG tablet Take 400 mg by mouth every 6 hours as needed for Pain   Yes Historical Provider, MD   Calcium Carb-Cholecalciferol (CALCIUM + VITAMIN D3) 500-400 MG-UNIT CHEW Take 1 tablet by mouth daily   Yes Historical Provider, MD   omeprazole (PRILOSEC) 40 MG delayed release capsule Take 1 capsule by mouth every morning (before breakfast) 8/5/21  Yes Andrew Dickens DO     Allergies:  Patient has no known allergies. Social History:    TOBACCO:   reports that he has been smoking. He has a 15.00 pack-year smoking history. He has never used smokeless tobacco.  ETOH:   reports no history of alcohol use. Family History:    Reviewed in detail and negative for DM, CAD, Cancer, CVA. Positive as follows\"      Problem Relation Age of Onset    Cancer Father         unknown    Cancer Brother         bladder    Blindness Son         Left eye    Other Son 6        Turret's (movement)    High Blood Pressure Mother        REVIEW OF SYSTEMS:   Pertinent positives as noted in the HPI. All other systems reviewed and negative.     PHYSICAL EXAM:  BP (!) 131/95 Pulse 76   Temp 97.6 °F (36.4 °C)   Resp 20   Ht 6' 2\" (1.88 m)   Wt 180 lb (81.6 kg)   SpO2 97%   BMI 23.11 kg/m²   General appearance: No apparent distress, appears stated age and cooperative. HEENT: Normal cephalic, atraumatic without obvious deformity. Pupils equal, round, and reactive to light. Extra ocular muscles intact. Conjunctivae/corneas clear. Neck: Supple, with full range of motion. No jugular venous distention. Trachea midline. Respiratory: Clear to auscultation bilaterally with no wheezes  Cardiovascular: Regular rhythm with normal S1-S2  Abdomen: Soft, nontender, nondistended  Musculoskeletal: No clubbing, cyanosis, edema of bilateral lower extremities. Brisk capillary refill. Skin: Normal skin color. No rashes or lesions. Neurologic:  Neurovascularly intact without any focal sensory/motor deficits. Cranial nerves: II-XII intact, grossly non-focal.  Psychiatric: Alert and oriented, thought content appropriate, normal insight    Reviewed EKG and CXR personally      CBC:   Recent Labs     12/20/21  1140   WBC 8.8   RBC 4.97   HGB 15.2   HCT 44.9   MCV 90.3   RDW 12.0        BMP:   Recent Labs     12/20/21  1140      K 4.0   *   CO2 25   BUN 11   CREATININE 1.4*   MG 1.9     LFT:  No results for input(s): PROT, ALB, ALKPHOS, ALT, AST, BILITOT, AMYLASE, LIPASE in the last 72 hours. CE:  No results for input(s): Rhae Childes in the last 72 hours. PT/INR: No results for input(s): INR, APTT in the last 72 hours. BNP: No results for input(s): BNP in the last 72 hours.   ESR: No results found for: SEDRATE  CRP: No results found for: CRP  D Dimer:   Lab Results   Component Value Date    DDIMER 397 09/19/2021      Folate and B12: No results found for: Shahbaz Erps, No results found for: FOLATE  Lactic Acid:   Lab Results   Component Value Date    LACTA 2.7 (H) 09/19/2021     Thyroid Studies:   Lab Results   Component Value Date    TSH 0.333 12/20/2021       Oupatient labs:  Lab Results   Component Value Date    CHOL 128 08/05/2020    TRIG 80 08/05/2020    HDL 48 08/05/2020    LDLCALC 64 08/05/2020    TSH 0.333 12/20/2021    PSA 9.61 (H) 04/17/2015    INR 1.1 08/05/2020       Urinalysis:  No results found for: NITRU, WBCUA, BACTERIA, RBCUA, BLOODU, SPECGRAV, GLUCOSEU    Imaging:  XR CHEST PORTABLE    Result Date: 12/20/2021  EXAMINATION: ONE XRAY VIEW OF THE CHEST 12/20/2021 12:58 pm COMPARISON: None. HISTORY: ORDERING SYSTEM PROVIDED HISTORY: chest pain TECHNOLOGIST PROVIDED HISTORY: Reason for exam:->chest pain What reading provider will be dictating this exam?->CRC FINDINGS: The lungs are without acute focal process. There is no effusion or pneumothorax. The cardiomediastinal silhouette is without acute process. The osseous structures are without acute process. No acute process. ASSESSMENT:    Ventricular tachycardia now resolved  Elevated troponin  Tobacco smoker      PLAN:    Admit to telemetry for observation overnight  Exercise stress test in the morning  Lipid panel  Smoking cessation counseling has been provided    Diet: Cardiac diet with n.p.o. after midnight  Code Status: Full code  Surrogate decision maker confirmed with patient:   Extended Emergency Contact Information  Primary Emergency Contact: [address] Phone: [phone]  Mobile Phone: [phone]  Relation: Brother/Sister    DVT Prophylaxis: []Lovenox [x]Heparin []PCD [] [address] []Encouraged ambulation  Disposition: []Med/Surg [x] Intermediate [] ICU/CCU  Admit status: [x] Observation [] Inpatient     +++++++++++++++++++++++++++++++++++++++++++++++++  Carline Pierce MD  [address]  +++++++++++++++++++++++++++++++++++++++++++++++++  NOTE: This report was transcribed using voice recognition software.  Every effort was made to ensure accuracy; however, inadvertent computerized transcription errors may be present.

## 2021-12-20 NOTE — ED PROVIDER NOTES
are moist.      Pharynx: No oropharyngeal exudate. Eyes:      General: No scleral icterus. Pupils: Pupils are equal, round, and reactive to light. Cardiovascular:      Rate and Rhythm: Normal rate and regular rhythm. Heart sounds: Normal heart sounds. No murmur heard. Comments: 2+ radial and dorsal pedis pulses bilaterally  Pulmonary:      Effort: Pulmonary effort is normal. No respiratory distress. Breath sounds: Normal breath sounds. No wheezing. Abdominal:      Palpations: Abdomen is soft. Tenderness: There is no abdominal tenderness. There is no guarding or rebound. Musculoskeletal:         General: No tenderness or deformity. Normal range of motion. Cervical back: Normal range of motion and neck supple. Right lower leg: No edema. Left lower leg: No edema. Skin:     General: Skin is warm and dry. Capillary Refill: Capillary refill takes less than 2 seconds. Findings: No rash. Neurological:      General: No focal deficit present. Mental Status: He is alert and oriented to person, place, and time. Cranial Nerves: No cranial nerve deficit. Sensory: No sensory deficit. Motor: No weakness or abnormal muscle tone. Psychiatric:         Mood and Affect: Mood normal.         Behavior: Behavior normal.          Procedures     MDM     This is a 21-year-old male who presents to the emergency department after episode of SVT given adenosine prior to arrival.  Patient in sinus rhythm with no complaints. Hemodynamically stable awake alert afebrile. Metabolic panel showed normal electrolytes creatinine 1.4. No labs to compare for baseline. Initial troponin 12 delta up to 31. No chest pain. Given rising troponin admitted for further cardiac observation. Patient remained in sinus rhythm with no complaints. No further SVT. No Kasai ptosis. No signs of underlying bacterial etiology for symptoms.   Discussed with on-call hospitalist accepts patient for further evaluation.                --------------------------------------------- PAST HISTORY ---------------------------------------------  Past Medical History:  has a past medical history of Chronic back pain, GERD (gastroesophageal reflux disease), Herniated nucleus pulposus, L2-3 left, Herniated nucleus pulposus, L4-5, Inguinal hernia, Left shoulder pain, and Sprain and strain of lumbosacral joint/ligament. Past Surgical History:  has a past surgical history that includes back surgery (7/28/03); Foot surgery (Left, 1980's); Mouth surgery; Tonsillectomy; Upper gastrointestinal endoscopy (2014); other surgical history (Left, 1990's); other surgical history (Right, 11/185/2014); hernia repair (Right, 11/18/2014); Colonoscopy (2014 ); Inguinal hernia repair (8/`14/15); Upper gastrointestinal endoscopy (N/A, 9/3/2020); and Cholecystectomy, laparoscopic (N/A, 9/21/2021). Social History:  reports that he has been smoking. He has a 15.00 pack-year smoking history. He has never used smokeless tobacco. He reports previous drug use. Drugs: Marijuana (Weed) and Cocaine. He reports that he does not drink alcohol. Family History: family history includes Blindness in his son; Cancer in his brother and father; High Blood Pressure in his mother; Other (age of onset: 10) in his son. The patients home medications have been reviewed. Allergies: Patient has no known allergies.     -------------------------------------------------- RESULTS -------------------------------------------------    LABS:  Results for orders placed or performed during the hospital encounter of 12/20/21   CBC Auto Differential   Result Value Ref Range    WBC 8.8 4.5 - 11.5 E9/L    RBC 4.97 3.80 - 5.80 E12/L    Hemoglobin 15.2 12.5 - 16.5 g/dL    Hematocrit 44.9 37.0 - 54.0 %    MCV 90.3 80.0 - 99.9 fL    MCH 30.6 26.0 - 35.0 pg    MCHC 33.9 32.0 - 34.5 %    RDW 12.0 11.5 - 15.0 fL    Platelets 005 915 - 021 E9/L    MPV 11.8 7.0 - 12.0 fL    Neutrophils % 65.2 43.0 - 80.0 %    Immature Granulocytes % 0.5 0.0 - 5.0 %    Lymphocytes % 21.1 20.0 - 42.0 %    Monocytes % 8.6 2.0 - 12.0 %    Eosinophils % 3.7 0.0 - 6.0 %    Basophils % 0.9 0.0 - 2.0 %    Neutrophils Absolute 5.75 1.80 - 7.30 E9/L    Immature Granulocytes # 0.04 E9/L    Lymphocytes Absolute 1.86 1.50 - 4.00 E9/L    Monocytes Absolute 0.76 0.10 - 0.95 E9/L    Eosinophils Absolute 0.33 0.05 - 0.50 E9/L    Basophils Absolute 0.08 0.00 - 0.20 B2/N   Basic metabolic panel   Result Value Ref Range    Sodium 142 132 - 146 mmol/L    Potassium 4.0 3.5 - 5.0 mmol/L    Chloride 108 (H) 98 - 107 mmol/L    CO2 25 22 - 29 mmol/L    Anion Gap 9 7 - 16 mmol/L    Glucose 124 (H) 74 - 99 mg/dL    BUN 11 6 - 23 mg/dL    CREATININE 1.4 (H) 0.7 - 1.2 mg/dL    GFR Non-African American 51 >=60 mL/min/1.73    GFR African American >60     Calcium 9.2 8.6 - 10.2 mg/dL   Magnesium   Result Value Ref Range    Magnesium 1.9 1.6 - 2.6 mg/dL   Troponin   Result Value Ref Range    Troponin, High Sensitivity 12 (H) 0 - 11 ng/L   TSH WITHOUT REFLEX   Result Value Ref Range    TSH 0.333 0.270 - 4.200 uIU/mL   Troponin   Result Value Ref Range    Troponin, High Sensitivity 20 (H) 0 - 11 ng/L   Troponin   Result Value Ref Range    Troponin, High Sensitivity 31 (H) 0 - 11 ng/L   Urinalysis   Result Value Ref Range    Color, UA Yellow Straw/Yellow    Clarity, UA Clear Clear    Glucose, Ur Negative Negative mg/dL    Bilirubin Urine Negative Negative    Ketones, Urine Negative Negative mg/dL    Specific Gravity, UA 1.025 1.005 - 1.030    Blood, Urine Negative Negative    pH, UA 6.0 5.0 - 9.0    Protein, UA Negative Negative mg/dL    Urobilinogen, Urine 1.0 <2.0 E.U./dL    Nitrite, Urine Negative Negative    Leukocyte Esterase, Urine Negative Negative   Troponin   Result Value Ref Range    Troponin, High Sensitivity 30 (H) 0 - 11 ng/L   Lipid panel - fasting   Result Value Ref Range    Cholesterol, Total 154 0 - 199 mg/dL Triglycerides 73 0 - 149 mg/dL    HDL 48 >40 mg/dL    LDL Calculated 91 0 - 99 mg/dL    VLDL Cholesterol Calculated 15 mg/dL   Hemoglobin A1c   Result Value Ref Range    Hemoglobin A1C 5.5 4.0 - 5.6 %   Lactic acid, plasma   Result Value Ref Range    Lactic Acid 1.0 0.5 - 2.2 mmol/L   Basic Metabolic Panel w/ Reflex to MG   Result Value Ref Range    Sodium 139 132 - 146 mmol/L    Potassium reflex Magnesium 3.7 3.5 - 5.0 mmol/L    Chloride 108 (H) 98 - 107 mmol/L    CO2 22 22 - 29 mmol/L    Anion Gap 9 7 - 16 mmol/L    Glucose 99 74 - 99 mg/dL    BUN 15 6 - 23 mg/dL    CREATININE 1.2 0.7 - 1.2 mg/dL    GFR Non-African American >60 >=60 mL/min/1.73    GFR African American >60     Calcium 8.9 8.6 - 10.2 mg/dL   CBC   Result Value Ref Range    WBC 8.1 4.5 - 11.5 E9/L    RBC 5.00 3.80 - 5.80 E12/L    Hemoglobin 15.1 12.5 - 16.5 g/dL    Hematocrit 44.4 37.0 - 54.0 %    MCV 88.8 80.0 - 99.9 fL    MCH 30.2 26.0 - 35.0 pg    MCHC 34.0 32.0 - 34.5 %    RDW 12.0 11.5 - 15.0 fL    Platelets 390 139 - 646 E9/L    MPV 11.6 7.0 - 12.0 fL   EKG 12 Lead   Result Value Ref Range    Ventricular Rate 75 BPM    Atrial Rate 75 BPM    P-R Interval 136 ms    QRS Duration 94 ms    Q-T Interval 404 ms    QTc Calculation (Bazett) 451 ms    P Axis 57 degrees    R Axis 30 degrees    T Axis 52 degrees   EKG 12 lead   Result Value Ref Range    Ventricular Rate 63 BPM    Atrial Rate 63 BPM    P-R Interval 142 ms    QRS Duration 94 ms    Q-T Interval 448 ms    QTc Calculation (Bazett) 458 ms    P Axis 36 degrees    R Axis -12 degrees    T Axis 62 degrees       RADIOLOGY:  NM Cardiac Stress Test Nuclear Imaging   Final Result      The myocardial perfusion imaging was abnormal.      The abnormality was a small fixed apical defect. Overall left ventricular systolic function was normal, EF 55%. Overall low risk myocardial perfusion study. Compared to previous study from August 5, 2020 which showed no   ischemia, EF 59%.       Angela Grullon

## 2021-12-21 ENCOUNTER — APPOINTMENT (OUTPATIENT)
Dept: NON INVASIVE DIAGNOSTICS | Age: 61
End: 2021-12-21
Payer: MEDICARE

## 2021-12-21 ENCOUNTER — APPOINTMENT (OUTPATIENT)
Dept: NUCLEAR MEDICINE | Age: 61
End: 2021-12-21
Payer: MEDICARE

## 2021-12-21 VITALS
SYSTOLIC BLOOD PRESSURE: 118 MMHG | DIASTOLIC BLOOD PRESSURE: 93 MMHG | TEMPERATURE: 97.8 F | OXYGEN SATURATION: 99 % | HEIGHT: 74 IN | HEART RATE: 77 BPM | BODY MASS INDEX: 22.13 KG/M2 | WEIGHT: 172.4 LBS | RESPIRATION RATE: 18 BRPM

## 2021-12-21 LAB
ANION GAP SERPL CALCULATED.3IONS-SCNC: 9 MMOL/L (ref 7–16)
BILIRUBIN URINE: NEGATIVE
BLOOD, URINE: NEGATIVE
BUN BLDV-MCNC: 15 MG/DL (ref 6–23)
CALCIUM SERPL-MCNC: 8.9 MG/DL (ref 8.6–10.2)
CHLORIDE BLD-SCNC: 108 MMOL/L (ref 98–107)
CHOLESTEROL, TOTAL: 154 MG/DL (ref 0–199)
CLARITY: CLEAR
CO2: 22 MMOL/L (ref 22–29)
COLOR: YELLOW
CREAT SERPL-MCNC: 1.2 MG/DL (ref 0.7–1.2)
GFR AFRICAN AMERICAN: >60
GFR NON-AFRICAN AMERICAN: >60 ML/MIN/1.73
GLUCOSE BLD-MCNC: 99 MG/DL (ref 74–99)
GLUCOSE URINE: NEGATIVE MG/DL
HBA1C MFR BLD: 5.5 % (ref 4–5.6)
HCT VFR BLD CALC: 44.4 % (ref 37–54)
HDLC SERPL-MCNC: 48 MG/DL
HEMOGLOBIN: 15.1 G/DL (ref 12.5–16.5)
KETONES, URINE: NEGATIVE MG/DL
LACTIC ACID: 1 MMOL/L (ref 0.5–2.2)
LDL CHOLESTEROL CALCULATED: 91 MG/DL (ref 0–99)
LEUKOCYTE ESTERASE, URINE: NEGATIVE
LV EF: 55 %
LVEF MODALITY: NORMAL
MCH RBC QN AUTO: 30.2 PG (ref 26–35)
MCHC RBC AUTO-ENTMCNC: 34 % (ref 32–34.5)
MCV RBC AUTO: 88.8 FL (ref 80–99.9)
NITRITE, URINE: NEGATIVE
PDW BLD-RTO: 12 FL (ref 11.5–15)
PH UA: 6 (ref 5–9)
PLATELET # BLD: 243 E9/L (ref 130–450)
PMV BLD AUTO: 11.6 FL (ref 7–12)
POTASSIUM REFLEX MAGNESIUM: 3.7 MMOL/L (ref 3.5–5)
PROTEIN UA: NEGATIVE MG/DL
RBC # BLD: 5 E12/L (ref 3.8–5.8)
SODIUM BLD-SCNC: 139 MMOL/L (ref 132–146)
SPECIFIC GRAVITY UA: 1.02 (ref 1–1.03)
TRIGL SERPL-MCNC: 73 MG/DL (ref 0–149)
UROBILINOGEN, URINE: 1 E.U./DL
VLDLC SERPL CALC-MCNC: 15 MG/DL
WBC # BLD: 8.1 E9/L (ref 4.5–11.5)

## 2021-12-21 PROCEDURE — 6370000000 HC RX 637 (ALT 250 FOR IP): Performed by: FAMILY MEDICINE

## 2021-12-21 PROCEDURE — 83036 HEMOGLOBIN GLYCOSYLATED A1C: CPT

## 2021-12-21 PROCEDURE — G0378 HOSPITAL OBSERVATION PER HR: HCPCS

## 2021-12-21 PROCEDURE — 93017 CV STRESS TEST TRACING ONLY: CPT

## 2021-12-21 PROCEDURE — 80061 LIPID PANEL: CPT

## 2021-12-21 PROCEDURE — 36415 COLL VENOUS BLD VENIPUNCTURE: CPT

## 2021-12-21 PROCEDURE — 85027 COMPLETE CBC AUTOMATED: CPT

## 2021-12-21 PROCEDURE — 2580000003 HC RX 258: Performed by: FAMILY MEDICINE

## 2021-12-21 PROCEDURE — 93018 CV STRESS TEST I&R ONLY: CPT | Performed by: INTERNAL MEDICINE

## 2021-12-21 PROCEDURE — 80048 BASIC METABOLIC PNL TOTAL CA: CPT

## 2021-12-21 PROCEDURE — 6360000002 HC RX W HCPCS: Performed by: FAMILY MEDICINE

## 2021-12-21 PROCEDURE — 96372 THER/PROPH/DIAG INJ SC/IM: CPT

## 2021-12-21 PROCEDURE — 3430000000 HC RX DIAGNOSTIC RADIOPHARMACEUTICAL: Performed by: RADIOLOGY

## 2021-12-21 PROCEDURE — A9500 TC99M SESTAMIBI: HCPCS | Performed by: RADIOLOGY

## 2021-12-21 PROCEDURE — 78452 HT MUSCLE IMAGE SPECT MULT: CPT

## 2021-12-21 PROCEDURE — 78452 HT MUSCLE IMAGE SPECT MULT: CPT | Performed by: INTERNAL MEDICINE

## 2021-12-21 PROCEDURE — 83605 ASSAY OF LACTIC ACID: CPT

## 2021-12-21 PROCEDURE — 93005 ELECTROCARDIOGRAM TRACING: CPT | Performed by: FAMILY MEDICINE

## 2021-12-21 PROCEDURE — 81003 URINALYSIS AUTO W/O SCOPE: CPT

## 2021-12-21 PROCEDURE — 93016 CV STRESS TEST SUPVJ ONLY: CPT | Performed by: INTERNAL MEDICINE

## 2021-12-21 RX ORDER — NITROGLYCERIN 0.4 MG/1
0.4 TABLET SUBLINGUAL EVERY 5 MIN PRN
Status: DISCONTINUED | OUTPATIENT
Start: 2021-12-21 | End: 2021-12-21 | Stop reason: HOSPADM

## 2021-12-21 RX ORDER — SODIUM CHLORIDE 9 MG/ML
25 INJECTION, SOLUTION INTRAVENOUS PRN
Status: DISCONTINUED | OUTPATIENT
Start: 2021-12-21 | End: 2021-12-21 | Stop reason: HOSPADM

## 2021-12-21 RX ORDER — ONDANSETRON 2 MG/ML
4 INJECTION INTRAMUSCULAR; INTRAVENOUS EVERY 6 HOURS PRN
Status: DISCONTINUED | OUTPATIENT
Start: 2021-12-21 | End: 2021-12-21 | Stop reason: HOSPADM

## 2021-12-21 RX ORDER — ACETAMINOPHEN 325 MG/1
650 TABLET ORAL EVERY 6 HOURS PRN
Status: DISCONTINUED | OUTPATIENT
Start: 2021-12-21 | End: 2021-12-21 | Stop reason: HOSPADM

## 2021-12-21 RX ORDER — ATORVASTATIN CALCIUM 40 MG/1
40 TABLET, FILM COATED ORAL NIGHTLY
Status: DISCONTINUED | OUTPATIENT
Start: 2021-12-21 | End: 2021-12-21 | Stop reason: HOSPADM

## 2021-12-21 RX ORDER — SODIUM CHLORIDE 0.9 % (FLUSH) 0.9 %
5-40 SYRINGE (ML) INJECTION EVERY 12 HOURS SCHEDULED
Status: DISCONTINUED | OUTPATIENT
Start: 2021-12-21 | End: 2021-12-21 | Stop reason: HOSPADM

## 2021-12-21 RX ORDER — ACETAMINOPHEN 650 MG/1
650 SUPPOSITORY RECTAL EVERY 6 HOURS PRN
Status: DISCONTINUED | OUTPATIENT
Start: 2021-12-21 | End: 2021-12-21 | Stop reason: HOSPADM

## 2021-12-21 RX ORDER — HEPARIN SODIUM 10000 [USP'U]/ML
5000 INJECTION, SOLUTION INTRAVENOUS; SUBCUTANEOUS EVERY 8 HOURS SCHEDULED
Status: DISCONTINUED | OUTPATIENT
Start: 2021-12-21 | End: 2021-12-21 | Stop reason: HOSPADM

## 2021-12-21 RX ORDER — CARVEDILOL 3.12 MG/1
3.12 TABLET ORAL 2 TIMES DAILY
Qty: 60 TABLET | Refills: 0 | Status: SHIPPED | OUTPATIENT
Start: 2021-12-21

## 2021-12-21 RX ORDER — POLYETHYLENE GLYCOL 3350 17 G/17G
17 POWDER, FOR SOLUTION ORAL DAILY PRN
Status: DISCONTINUED | OUTPATIENT
Start: 2021-12-21 | End: 2021-12-21 | Stop reason: HOSPADM

## 2021-12-21 RX ORDER — ONDANSETRON 4 MG/1
4 TABLET, ORALLY DISINTEGRATING ORAL EVERY 8 HOURS PRN
Status: DISCONTINUED | OUTPATIENT
Start: 2021-12-21 | End: 2021-12-21 | Stop reason: HOSPADM

## 2021-12-21 RX ORDER — PANTOPRAZOLE SODIUM 40 MG/1
40 TABLET, DELAYED RELEASE ORAL ONCE
Status: DISCONTINUED | OUTPATIENT
Start: 2021-12-21 | End: 2021-12-21 | Stop reason: HOSPADM

## 2021-12-21 RX ORDER — OMEPRAZOLE 40 MG/1
40 CAPSULE, DELAYED RELEASE ORAL
Qty: 7 CAPSULE | Refills: 0 | Status: SHIPPED | OUTPATIENT
Start: 2021-12-21

## 2021-12-21 RX ORDER — SODIUM CHLORIDE 0.9 % (FLUSH) 0.9 %
5-40 SYRINGE (ML) INJECTION PRN
Status: DISCONTINUED | OUTPATIENT
Start: 2021-12-21 | End: 2021-12-21 | Stop reason: HOSPADM

## 2021-12-21 RX ORDER — SODIUM CHLORIDE 9 MG/ML
INJECTION, SOLUTION INTRAVENOUS CONTINUOUS
Status: DISCONTINUED | OUTPATIENT
Start: 2021-12-21 | End: 2021-12-21

## 2021-12-21 RX ORDER — ASPIRIN 81 MG/1
81 TABLET, CHEWABLE ORAL DAILY
Status: DISCONTINUED | OUTPATIENT
Start: 2021-12-21 | End: 2021-12-21 | Stop reason: HOSPADM

## 2021-12-21 RX ADMIN — SODIUM CHLORIDE: 9 INJECTION, SOLUTION INTRAVENOUS at 04:00

## 2021-12-21 RX ADMIN — Medication 12 MILLICURIE: at 08:32

## 2021-12-21 RX ADMIN — ASPIRIN 81 MG CHEWABLE TABLET 81 MG: 81 TABLET CHEWABLE at 11:46

## 2021-12-21 RX ADMIN — REGADENOSON 0.4 MG: 0.08 INJECTION, SOLUTION INTRAVENOUS at 09:50

## 2021-12-21 RX ADMIN — Medication 35 MILLICURIE: at 10:10

## 2021-12-21 RX ADMIN — ATORVASTATIN CALCIUM 40 MG: 40 TABLET, FILM COATED ORAL at 11:46

## 2021-12-21 RX ADMIN — HEPARIN SODIUM 5000 UNITS: 10000 INJECTION INTRAVENOUS; SUBCUTANEOUS at 06:00

## 2021-12-21 NOTE — DISCHARGE SUMMARY
Hospitalist Discharge Summary    Patient ID: Julia Johnson   Patient : 1960  Patient's PCP: No primary care provider on file. Admit Date: 2021   Admitting Physician: Stefan Steve MD    Discharge Date:  2021  Discharge Physician: Stefan Steve MD   Discharge Condition: Stable  Discharge Disposition: [facility] course in brief:  (Please refer to daily progress notes for a comprehensive review of the hospitalization by requesting medical records)    Jerri Álvarez Sr. underwent a Lexiscan/Nuclear exercise stress test on 2021, with no evidence of LV myocardium at risk for stress-induced ischemia. Consults:   IP CONSULT TO INTERNAL MEDICINE    Discharge Diagnoses:    SupraVentricular tachycardia   - ACS ruled out with stress test  FAUSTINO - resolved  GERD  Elevated troponin  Tobacco smoker    Discharge Instructions / Follow up:    No future appointments. Continued appropriate risk factor modification of blood pressure, diabetes and serum lipids will remain essential to reducing risk of future atherosclerotic development    Activity: activity as tolerated    Significant labs:  CBC:   Recent Labs     21  1140 21  0541   WBC 8.8 8.1   RBC 4.97 5.00   HGB 15.2 15.1   HCT 44.9 44.4   MCV 90.3 88.8   RDW 12.0 12.0    243     BMP:   Recent Labs     21  1140 21  0541    139   K 4.0 3.7   * 108*   CO2 25 22   BUN 11 15   CREATININE 1.4* 1.2   MG 1.9  --      LFT:  No results for input(s): PROT, ALB, ALKPHOS, ALT, AST, BILITOT, AMYLASE, LIPASE in the last 72 hours. PT/INR: No results for input(s): INR, APTT in the last 72 hours. BNP: No results for input(s): BNP in the last 72 hours.   Hgb A1C:   Lab Results   Component Value Date    LABA1C 5.5 2021     Folate and B12: No results found for: [id number], No results found for: FOLATE  Thyroid Studies:   Lab Results   Component Value Date    TSH 0.333 2021       Urinalysis: Lab Results   Component Value Date    NITRU Negative 12/21/2021    BLOODU Negative 12/21/2021    SPECGRAV 1.025 12/21/2021    GLUCOSEU Negative 12/21/2021       Imaging:  XR CHEST PORTABLE    Result Date: 12/20/2021  EXAMINATION: ONE XRAY VIEW OF THE CHEST 12/20/2021 12:58 pm COMPARISON: None. HISTORY: ORDERING SYSTEM PROVIDED HISTORY: chest pain TECHNOLOGIST PROVIDED HISTORY: Reason for exam:->chest pain What reading provider will be dictating this exam?->CRC FINDINGS: The lungs are without acute focal process. There is no effusion or pneumothorax. The cardiomediastinal silhouette is without acute process. The osseous structures are without acute process. No acute process. Discharge Medications:      Medication List      START taking these medications    carvedilol 3.125 MG tablet  Commonly known as: Coreg  Take 1 tablet by mouth 2 times daily        CONTINUE taking these medications    Calcium + Vitamin D3 500-400 MG-UNIT Chew  Generic drug: Calcium Carb-Cholecalciferol     ibuprofen 200 MG tablet  Commonly known as: ADVIL;MOTRIN     omeprazole 40 MG delayed release capsule  Commonly known as: PRILOSEC  Take 1 capsule by mouth every morning (before breakfast)           Where to Get Your Medications      These medications were sent to [facility] [address] [address]    Phone: [phone]   · carvedilol 3.125 MG tablet  · omeprazole 40 MG delayed release capsule         Time Spent on discharge is more than 45 minutes in the examination, evaluation, counseling and review of medications and discharge plan.    +++++++++++++++++++++++++++++++++++++++++++++++++  Tammie Woo MD  [address]  +++++++++++++++++++++++++++++++++++++++++++++++++  NOTE: This report was transcribed using voice recognition software.  Every effort was made to ensure accuracy; however, inadvertent computerized transcription errors may be present.

## 2021-12-21 NOTE — PROGRESS NOTES
Lexiscan Stress Test:    Reason for study: Chest pain    Resting EKG showed Sinus rhythm  Exam:  Heart - regular, Lungs- clear    Patient received 0.4mg Lexiscan per protocol    Symptoms: No chest pain, Mild short of breath, resolved    EKG:  No ischemia or arrhythmia during Lexiscan infusion. Maximal heart rate 100         Peak /87mmHg       Post test complications: None      SPECT image report pending.     Electronically signed by Parish Lino MD on 12/21/2021 at 10:21 AM

## 2021-12-21 NOTE — PROGRESS NOTES
Hospitalist Progress Note      SYNOPSIS: Patient admitted on 2021 for SVT      SUBJECTIVE:    Patient seen and examined  Records reviewed. No CP overnight  SO at bedside    Stable overnight. No other overnight issues reported. Temp (24hrs), Av.8 °F (36.6 °C), Min:97.6 °F (36.4 °C), Max:97.9 °F (36.6 °C)    DIET: Diet NPO  CODE: Full Code  No intake or output data in the 24 hours ending 21 1003    OBJECTIVE:    BP (!) 131/92   Pulse 77   Temp 97.9 °F (36.6 °C) (Temporal)   Resp 16   Ht 6' 2\" (1.88 m)   Wt 172 lb 6.4 oz (78.2 kg)   SpO2 98%   BMI 22.13 kg/m²     General appearance: No apparent distress, appears stated age and cooperative. HEENT:  Conjunctivae/corneas clear. Neck: Supple. No jugular venous distention. Respiratory: Clear to auscultation bilaterally, normal respiratory effort  Cardiovascular: Regular rate rhythm, normal S1-S2  Abdomen: Soft, nontender, nondistended  Musculoskeletal: No clubbing, cyanosis, no bilateral lower extremity edema. Brisk capillary refill.    Skin:  No rashes  on visible skin  Neurologic: awake, alert and following commands     ASSESSMENT:    SupraVentricular tachycardia now resolved  Elevated troponin  Mild FAUSTINO, possible CKD  Tobacco smoker     PLAN:    Exercise stress test this AM  Lipid panel reviewed  Smoking cessation counseling has been provided      DISPOSITION:  Home pending stress    Medications:  REVIEWED DAILY    Infusion Medications    sodium chloride      sodium chloride 100 mL/hr at 21 0400     Scheduled Medications    sodium chloride flush  5-40 mL IntraVENous 2 times per day    aspirin  81 mg Oral Daily    atorvastatin  40 mg Oral Nightly    heparin (porcine)  5,000 Units SubCUTAneous 3 times per day    technetium sestamibi  35 millicurie IntraVENous Once     PRN Meds: sodium chloride flush, sodium chloride, ondansetron **OR** ondansetron, acetaminophen **OR** acetaminophen, polyethylene glycol, nitroGLYCERIN    Labs:     Recent Labs     12/20/21  1140 12/21/21  0541   WBC 8.8 8.1   HGB 15.2 15.1   HCT 44.9 44.4    243       Recent Labs     12/20/21  1140 12/21/21  0541    139   K 4.0 3.7   * 108*   CO2 25 22   BUN 11 15   CREATININE 1.4* 1.2   CALCIUM 9.2 8.9       No results for input(s): PROT, ALB, ALKPHOS, ALT, AST, BILITOT, AMYLASE, LIPASE in the last 72 hours. No results for input(s): INR in the last 72 hours. No results for input(s): Curlie Lat in the last 72 hours. Chronic labs:    Lab Results   Component Value Date    CHOL 154 12/21/2021    TRIG 73 12/21/2021    HDL 48 12/21/2021    LDLCALC 91 12/21/2021    TSH 0.333 12/20/2021    PSA 9.61 (H) 04/17/2015    INR 1.1 08/05/2020    LABA1C 5.5 12/21/2021       Radiology: REVIEWED DAILY    +++++++++++++++++++++++++++++++++++++++++++++++++  Jan Mcduffie MD  [employer] - [address] [place], [place]  +++++++++++++++++++++++++++++++++++++++++++++++++  NOTE: This report was transcribed using voice recognition software. Every effort was made to ensure accuracy; however, inadvertent computerized transcription errors may be present.

## 2021-12-22 LAB
EKG ATRIAL RATE: 63 BPM
EKG ATRIAL RATE: 75 BPM
EKG P AXIS: 36 DEGREES
EKG P AXIS: 57 DEGREES
EKG P-R INTERVAL: 136 MS
EKG P-R INTERVAL: 142 MS
EKG Q-T INTERVAL: 404 MS
EKG Q-T INTERVAL: 448 MS
EKG QRS DURATION: 94 MS
EKG QRS DURATION: 94 MS
EKG QTC CALCULATION (BAZETT): 451 MS
EKG QTC CALCULATION (BAZETT): 458 MS
EKG R AXIS: -12 DEGREES
EKG R AXIS: 30 DEGREES
EKG T AXIS: 52 DEGREES
EKG T AXIS: 62 DEGREES
EKG VENTRICULAR RATE: 63 BPM
EKG VENTRICULAR RATE: 75 BPM

## 2021-12-22 PROCEDURE — 93010 ELECTROCARDIOGRAM REPORT: CPT | Performed by: INTERNAL MEDICINE

## 2022-06-08 ENCOUNTER — HOSPITAL ENCOUNTER (OUTPATIENT)
Age: 62
Discharge: HOME | End: 2022-06-10

## 2024-05-08 ENCOUNTER — TRANSCRIBE ORDERS (OUTPATIENT)
Dept: ADMINISTRATIVE | Age: 64
End: 2024-05-08

## 2024-05-08 DIAGNOSIS — F17.210 CIGARETTE SMOKER: ICD-10-CM

## 2024-05-08 DIAGNOSIS — Z87.891 PERSONAL HISTORY OF TOBACCO USE, PRESENTING HAZARDS TO HEALTH: Primary | ICD-10-CM

## 2024-05-28 ENCOUNTER — HOSPITAL ENCOUNTER (OUTPATIENT)
Dept: CT IMAGING | Age: 64
Discharge: HOME | End: 2024-05-30
Payer: MEDICARE

## 2024-05-28 DIAGNOSIS — F17.210 CIGARETTE SMOKER: ICD-10-CM

## 2024-05-28 DIAGNOSIS — Z87.891 PERSONAL HISTORY OF TOBACCO USE, PRESENTING HAZARDS TO HEALTH: ICD-10-CM

## 2024-05-28 PROCEDURE — 71271 CT THORAX LUNG CANCER SCR C-: CPT

## (undated) DEVICE — MEDIUM-LARGE CLIP APPLIER: Brand: ENDOWRIST

## (undated) DEVICE — PACK PROCEDURE SURG GEN CUST

## (undated) DEVICE — CADIERE FORCEPS: Brand: ENDOWRIST

## (undated) DEVICE — BLOCK BITE 60FR RUBBER ADLT DENTAL

## (undated) DEVICE — CHLORAPREP 26ML ORANGE

## (undated) DEVICE — INSUFFLATION TUBING SET WITH FILTER, FUNNEL CONNECTOR AND LUER LOCK: Brand: JOSNOE MEDICAL INC

## (undated) DEVICE — ELECTRODE PT RET AD L9FT HI MOIST COND ADH HYDRGEL CORDED

## (undated) DEVICE — INSUFFLATION NEEDLE TO ESTABLISH PNEUMOPERITONEUM.: Brand: INSUFFLATION NEEDLE

## (undated) DEVICE — DOUBLE BASIN SET: Brand: MEDLINE INDUSTRIES, INC.

## (undated) DEVICE — INTENDED FOR TISSUE SEPARATION, AND OTHER PROCEDURES THAT REQUIRE A SHARP SURGICAL BLADE TO PUNCTURE OR CUT.: Brand: BARD-PARKER ® STAINLESS STEEL BLADES

## (undated) DEVICE — GRADUATE TRIANG MEASURE 1000ML BLK PRNT

## (undated) DEVICE — FORCEPS BX OVL CUP FEN DISPOSABLE CAP L 160CM RAD JAW 4

## (undated) DEVICE — BLADELESS OBTURATOR: Brand: WECK VISTA

## (undated) DEVICE — TOWEL,OR,DSP,ST,BLUE,STD,6/PK,12PK/CS: Brand: MEDLINE

## (undated) DEVICE — MARYLAND BIPOLAR FORCEPS: Brand: ENDOWRIST

## (undated) DEVICE — WARMER SCP 2 ANTIFOG LAP DISP

## (undated) DEVICE — ARM DRAPE

## (undated) DEVICE — GLOVE SURG SZ 75 L12IN FNGR THK83MIL CRM POLYISOPRENE

## (undated) DEVICE — GLOVE SURG SZ 75 L12IN FNGR THK94MIL TRNSLUC YEL LTX

## (undated) DEVICE — ANCHOR TISSUE RETRIEVAL SYSTEM, BAG SIZE 125 ML, PORT SIZE 8 MM: Brand: ANCHOR TISSUE RETRIEVAL SYSTEM

## (undated) DEVICE — SPONGE GZ W4XL4IN RAYON POLY FILL CVR W/ NONWOVEN FAB

## (undated) DEVICE — CAMERA STRYKER 1488

## (undated) DEVICE — SCISSORS SURG DIA8MM MPLR CRV ENDOWRIST

## (undated) DEVICE — KIT,ANTI FOG,W/SPONGE & FLUID,SOFT PACK: Brand: MEDLINE

## (undated) DEVICE — BLADE CLIPPER GEN PURP NS

## (undated) DEVICE — PROGRASP FORCEPS: Brand: ENDOWRIST

## (undated) DEVICE — SUCTION IRRIGATOR: Brand: ENDOWRIST

## (undated) DEVICE — COLUMN DRAPE

## (undated) DEVICE — INSTRUMENT CLAMP TOWEL LARGE REUSABLE

## (undated) DEVICE — SYRINGE 20ML LL S/C 50

## (undated) DEVICE — DRAPE,LAP,CHOLE,W/TROUGHS,STERILE: Brand: MEDLINE

## (undated) DEVICE — CANNULA SEAL

## (undated) DEVICE — NEEDLE HYPO 25GA L1.5IN BLU POLYPR HUB S STL REG BVL STR

## (undated) DEVICE — ADHESIVE SKIN CLSR 0.7ML TOP DERMBND ADV

## (undated) DEVICE — TRAY 30 DEG STRYKER 5MM/10MM LENS REUSABLE